# Patient Record
Sex: FEMALE | Race: ASIAN | NOT HISPANIC OR LATINO | Employment: FULL TIME
[De-identification: names, ages, dates, MRNs, and addresses within clinical notes are randomized per-mention and may not be internally consistent; named-entity substitution may affect disease eponyms.]

---

## 2017-02-08 ENCOUNTER — RECORDS - HEALTHEAST (OUTPATIENT)
Dept: ADMINISTRATIVE | Facility: OTHER | Age: 27
End: 2017-02-08

## 2017-02-14 ENCOUNTER — AMBULATORY - HEALTHEAST (OUTPATIENT)
Dept: LAB | Facility: HOSPITAL | Age: 27
End: 2017-02-14

## 2017-02-14 DIAGNOSIS — O99.810 ABNORMAL MATERNAL GLUCOSE TOLERANCE, ANTEPARTUM: ICD-10-CM

## 2017-04-30 ENCOUNTER — ANESTHESIA - HEALTHEAST (OUTPATIENT)
Dept: OBGYN | Facility: HOSPITAL | Age: 27
End: 2017-04-30

## 2017-05-01 ENCOUNTER — HOSPITAL ENCOUNTER (INPATIENT)
Dept: OBGYN | Facility: HOSPITAL | Age: 27
Discharge: HOME | End: 2017-05-04
Attending: OBSTETRICS & GYNECOLOGY | Admitting: OBSTETRICS & GYNECOLOGY
Payer: COMMERCIAL

## 2017-05-01 ENCOUNTER — SURGERY - HEALTHEAST (OUTPATIENT)
Dept: OBGYN | Facility: HOSPITAL | Age: 27
End: 2017-05-01

## 2017-05-01 DIAGNOSIS — Z98.891 S/P CESAREAN SECTION: ICD-10-CM

## 2017-05-01 LAB — SYPHILIS RPR SCREEN - HISTORICAL: NORMAL

## 2017-05-01 ASSESSMENT — MIFFLIN-ST. JEOR
SCORE: 1629.88
SCORE: 1629.88

## 2017-05-04 RX ORDER — FERROUS GLUCONATE 324(38)MG
324 TABLET ORAL
Refills: 0 | Status: SHIPPED | COMMUNITY
Start: 2017-05-04 | End: 2024-05-10

## 2017-05-04 RX ORDER — AMOXICILLIN 250 MG
1 CAPSULE ORAL DAILY
Refills: 0 | Status: SHIPPED | COMMUNITY
Start: 2017-05-04 | End: 2024-05-10

## 2017-10-15 ENCOUNTER — HEALTH MAINTENANCE LETTER (OUTPATIENT)
Age: 27
End: 2017-10-15

## 2021-05-11 ENCOUNTER — COMMUNICATION - HEALTHEAST (OUTPATIENT)
Dept: SCHEDULING | Facility: CLINIC | Age: 31
End: 2021-05-11

## 2021-05-27 ENCOUNTER — AMBULATORY - HEALTHEAST (OUTPATIENT)
Dept: NURSING | Facility: CLINIC | Age: 31
End: 2021-05-27

## 2021-06-15 ENCOUNTER — AMBULATORY - HEALTHEAST (OUTPATIENT)
Dept: NURSING | Facility: CLINIC | Age: 31
End: 2021-06-15

## 2021-07-15 VITALS
HEIGHT: 62 IN | HEIGHT: 62 IN | BODY MASS INDEX: 39.01 KG/M2 | WEIGHT: 212 LBS | WEIGHT: 212 LBS | BODY MASS INDEX: 39.01 KG/M2

## 2021-07-15 NOTE — OP NOTE
Section Operative Note    NAME:  Hailey Pompa     RECORD # [id number]     2017    DATE OF SERVICE: 2017     PREOPERATIVE DIAGNOSIS:   1) IUP at 39+1 weeks  2) Previous  section x 2    PROCEDURE: Low transverse  section, Lysis of Adhesions    SURGEON:  Sukhi Hines MD     ASSISTANT: ANGUS Soto    ANESTHESIA: Spinal    ESTIMATED BLOOD LOSS: 500cc    DRAINS: Thakkar catheter.    COMPLICATIONS: None    INDICATIONS:  Ms. Hailey Pompa is a 26 y.o. year old who presents for a repeat  section. SHe has had 2 cesareans in the past. Options discussed. Risks and benefits discussed.    FINDINGS:  Live male infant born with Apgars of 8 at one minute, 9 at 5 minutes,  Weight 7#15oz.  Normal tubes, ovaries, and pelvic organs. There were dense adhesions from the bladder to the anterior abdominal wall. The dome of the bladder was upto the lower uterine segment. Dense omental adhesions to the anterior abdominal wall as well.     PROCEDURE:  Patient was met preoperatively where we discussed the procedure and the risks associated with the procedure.  She understood these to include but not limited to injury to adjacent organs including bowel, bladder, ureter, infection and bleeding. Understanding these risks her consents were signed.      After informed consent was obtained, the patient was taken to the operating room where she was given spinal anesthesia.  She was placed in the left lateral tilt position and prepped and draped in usual sterile fashion.  A timeout was undertaken.      A Pfannenstiel skin incision was made with the scalpel and taken down through the subcutaneous tissue.  The rectus fascia was identified and scored in the midline.  The rectus fascia was then opened bilaterally.  The rectus fascia was taken down from the underlying muscle superiorly and inferiorly.  The peritoneum was identified and entered bluntly using my fingers.  At this point dense omental adhesions were  encountered. These were taken down with the cautery. The bladder was identified and dense adhesions noted coming upto the lower uterine segment. These were taken down with blunt and sharp dissection. Once there was enough room for delivery of the infant, the bladder blade inserted.    A low segment transverse uterine incision was made with a scalpel.  The uterine incision was opened bluntly with my fingers.  The infant was delivered from a vertex position.  The head was delivered with the assistance of a vacuum. One pull was undertaken and the head was delivered.  The remainder of the body was delivered without issues.  The mouth and nares were suctioned.  The cord was doubly clamped and cut (delayed cord clamping) and the infant was handed to the pediatric team with the findings as noted above.  The placenta was then removed with gentle traction.  The gutters were irrigated. Hemostasis was achieved.  The uterine incision was closed using 0 Vicryl in a running locked fashion.  A second imbricating layer was placed using 0 Monocryl.  Hemostasis was achieved.      The peritoneum was closed using 3-0 Vicryl. The rectus muscles were hemostatic.  The rectus fascia was closed using 0 vicryl, starting at the lateral edges meeting in the midline.  Skin margins were re-approximated using the Insorb staplers.      Sponge and needle counts were reported to me as correct X 2.  Mother and infant are stable.      Sukhi Hines MD

## 2021-07-15 NOTE — ANESTHESIA CARE TRANSFER NOTE
Last vitals:   Vitals:    05/01/17 0900   BP: 114/75   Pulse: 72   Resp: 16   Temp: 37.4  C (99.3  F)   SpO2: 96%     Patient's level of consciousness is awake  Spontaneous respirations: yes  Maintains airway independently: yes  Dentition unchanged: yes  Oropharynx: oropharynx clear of all foreign objects    QCDR Measures:  ASA# 20 - Surgical Safety Checklist: ASA20A - Safety Checks Done  PQRS# 430 - Adult PONV Prevention: 4558F - Pt received => 2 anti-emetic agents (different classes) preop & intraop  ASA# 8 - Peds PONV Prevention: NA - Not pediatric patient, not GA or 2 or more risk factors NOT present  PQRS# 424 - Sagrario-op Temp Management: 4559F - At least one body temp DOCUMENTED => 35.5C or 95.9F within required timeframe  PQRS# 426 - PACU Transfer Protocol: - Transfer of care checklist used  ASA# 14 - Acute Post-op Pain: ASA14B - Patient did NOT experience pain >= 7 out of 10    I completed my SBAR handoff to the receiving nurse per policy and procedure.

## 2021-07-15 NOTE — DISCHARGE SUMMARY
HOSPITAL DISCHARGE SUMMARY - C SECTION     NAME: Hailey Pompa   : 1990    MRN: [id number]    PCP: No Primary Care Provider    ADMISSION DATE:  2017  DELIVERY DATE:   2017   GESTATIONAL AGE:  39w1d     DISCHARGE DATE:  2017    REASON FOR ADMISSION: IUP 39.1 weeks, Previous  x 2    DIAGNOSIS:    1.  Birth secondary to elective repeat  2. Apgars of 8    at 1 minute, 9   at 5 minutes  3. Weight (oz):  126.98  oz.  4. Blood loss anemia    CONDITIONS COMPLICATION ANTEPARTUM/POSTPARTUM:  Refer to prenatal   Antepartum: Previous  x 2   Intrapartum: None  Postpartum: Anemia - due to surgical blood loss    PROCEDURES:  Low transverse     SIGNIFICANT DIAGNOSTIC PROCEDURES:   None    CONSULTS:   None    HISTORY OF PRESENT ILLNESS AND HOSPITAL COURSE: Hailey Pompa  is a 26 y.o.,  female who underwent repeat  section. Postoperative course was remarkable for blood loss anemia.  Patient is asymptomatic and vital signs are stable. She denies chest pain, shortness of breath or dizziness. Patient denies any new abdominal pain.  On the day of discharge patient was tolerating diet, pain was controlled with oral medications, she was voiding and passing gas.    EXAM:  Incision - slightly ecchymotic but intact. Abdomen is soft and relatively nontender. LEs - show 1+ edema, L slightly more than R. Neg Homans.     LABS:  Lab Results   Component Value Date    HGB 7.1 (L) 2017       Pre-operative hgb : 11+      PENDING LABS:  None     DISPOSITION:  Home    DISCHARGE CONDITION: Good/Stable    DISCHARGE MEDICATIONS:      Medication List      START taking these medications          ferrous gluconate 324 MG tablet   Commonly known as:  FERGON   Take 1 tablet (324 mg total) by mouth daily with breakfast.       ibuprofen 600 MG tablet   Commonly known as:  ADVIL,MOTRIN   Take 1 tablet (600 mg total) by mouth every 6 (six) hours for 10 days.       oxyCODONE-acetaminophen  5-325 mg per tablet   Commonly known as:  PERCOCET   Take 1-2 tablets by mouth every 4 (four) hours as needed.       senna-docusate 8.6-50 mg tablet   Commonly known as:  PERICOLACE   Take 1 tablet by mouth daily.            Where to Get Your Medications      You can get these medications from any pharmacy     Bring a paper prescription for each of these medications      ibuprofen 600 MG tablet     oxyCODONE-acetaminophen 5-325 mg per tablet       You don't need a prescription for these medications      ferrous gluconate 324 MG tablet     senna-docusate 8.6-50 mg tablet               DISCHARGE PLAN:   - Follow up with  Dr. Hines, in 1-2 weeks  - Take medication as prescribed  - Physical activity: As tolerated, no heavy lifting. Pelvic rest.  - Diet:  Regular  - Medication:  Please see MAR - iron added  - Warning signs discussed with patient about when to call the clinic/hospital  - All questions and concerns were answered for the patient prior to discharge.         Yocasta Weston MD     I saw the patient on the date of discharge  Total time spent for discharge on date of discharge: 20 minutes    Physician(s) in addition to primary physician who should receive a copy:  CC1: DICK Hines MD

## 2021-07-15 NOTE — ANESTHESIA PROCEDURE NOTES
Spinal Block    Patient location during procedure: OB  Start time: 5/1/2017 8:45 AM  End time: 5/1/2017 8:48 AM  Reason for block: primary anesthetic    Staffing:  Performing  Anesthesiologist: SALLIE OSORIO    Preanesthetic Checklist  Completed: patient identified, risks, benefits, and alternatives discussed, timeout performed, consent obtained, airway assessed, oxygen available, suction available, emergency drugs available and hand hygiene performed  Spinal Block  Patient position: sitting  Prep: ChloraPrep  Patient monitoring: heart rate, cardiac monitor, continuous pulse ox and blood pressure  Approach: midline  Location: L3-4  Injection technique: single-shot  Needle type: pencil-tip   Needle gauge: 24 G    Assessment  Sensory level: T6

## 2021-07-15 NOTE — H&P
"OB HISTORY AND PHYSICAL UPDATE ADMISSION EXAM    Hailey Pompa  1990  [id number]    HPI: 27 yo  at 39+1 weeks admitted for a repeat  section.    Estimated Date of Delivery: 17                       EGA 39w1d    OB History    Para Term  AB Living   3 3 3 0 0 3   SAB TAB Ectopic Multiple Live Births   0 0 0 0       # Outcome Date GA Lbr Derrek/2nd Weight Sex Delivery Anes PTL Lv   3 Term 17 39w1d  7 lb 15 oz (3.6 kg) M C-Sxn Vac Spinal N CAMILLE   2 Term      CS-LTranv   CAMILLE   1 Term      CS-LTranv   CAMILLE          Prenatal Complications None    Exam:    /74 (Patient Position: Sitting)  Pulse 96  Temp 98.1  F (36.7  C) (Oral)   Resp 16  Ht 5' 2\" (1.575 m)  Wt 212 lb (96.2 kg)  SpO2 98%  Breastfeeding? Unknown  BMI 38.78 kg/m2        HEENT          WNL              Heart              WNL               Lungs             WNL                      Abdomen        WNL                       Extremities     WNL                     Fetal Status    Category I    Assessment: For  section    Plan: Planned  section    Sukhi Hines MD  "

## 2021-07-15 NOTE — ANESTHESIA PREPROCEDURE EVALUATION
Anesthesia Evaluation      Patient summary reviewed     Airway   Mallampati: II  Neck ROM: full   Pulmonary - normal exam   (+) a smoker    ROS comment: Recent acute URI                         Cardiovascular - negative ROS and normal exam   Neuro/Psych      Endo/Other    (+) obesity, pregnant     GI/Hepatic/Renal - negative ROS      Other findings: Repeat Csection      Dental                         Anesthesia Plan  Planned anesthetic: spinal    ASA 2   Induction: intravenous   Anesthetic plan and risks discussed with: patient  Anesthesia plan special considerations: antiemetics,   Post-op plan: routine recovery

## 2021-07-15 NOTE — TELEPHONE ENCOUNTER
Needs to establish primary care - has been having lower R abdominal/flank pain. Radiates from back to front. On/off last 3 months. History of familial issues with kidney. Rates pain 7-8/10. No relief from tylenol or hot packs.     Per protocol advised ED evaluation - patient wants to be seen in clinic. Reviewed protocol recommendations - patient wanting clinic appointment. Warm transferred to scheduling.     Aylin Sheppard RN, Triage Nurse Advisor    Reason for Disposition    [1] SEVERE pain (e.g., excruciating, scale 8-10) AND [2] present > 1 hour    Additional Information    Negative: Passed out (i.e., lost consciousness, collapsed and was not responding)    Negative: Shock suspected (e.g., cold/pale/clammy skin, too weak to stand, low BP, rapid pulse)    Negative: Difficult to awaken or acting confused (e.g., disoriented, slurred speech)    Negative: Sounds like a life-threatening emergency to the triager    Negative: Followed a major injury to the back (e.g., MVA, fall > 10 feet or 3 meters, penetrating injury, etc.)    Negative: Back pain or flank pain during pregnancy    Negative: Upper, mid or lower back pain that occurs mainly in the midline    Protocols used: FLANK PAIN-A-AH

## 2021-07-15 NOTE — PROGRESS NOTES
Postpartum Day 2    Patient Name:  Hailey Pompa  :  1990  MRN:  [id number]      Assessment:  Blood loss anemia, vital stable, declines transfusion at this time.    Plan:  Continue current care.  Repeat Hb later today. Discharge home tomorrow.      Subjective:  The patient feels tired but voiding without difficulty, lochia normal, tolerating normal diet, and passing flatus.  Pain is well controlled with current medications.    Objective:  Vitals:    17 0700   BP: 92/57   Pulse: 78   Resp: 16   Temp: 98.2  F (36.8  C)   SpO2: 98%     Patient Vitals for the past 24 hrs:   BP Temp Temp src Pulse Resp SpO2   17 0700 92/57 98.2  F (36.8  C) Oral 78 16 98 %   17 2340 118/66 97.6  F (36.4  C) Oral (!) 104 18 97 %       The amount and color of the lochia is appropriate for the duration of recovery.  The uterine fundus is firm.  Urinary output is adequate.  The incision is slight bruising, inzorb The patient is ambulating well.  The patient is tolerating a regular diet.  Hb 11-7.6-7.4-6.8    Prenatal Labs  Result Component Result Previous Result   ABO/Rh External Result O Positive (10/13/2016) No Results   ABORh O POS (2017) O POS (2012)   Hemoglobin External Result 12.3 (10/13/2016) No Results   Rubella External Result Immune (10/13/2016) No Results       Immunization History   Administered Date(s) Administered     Tdap 2008         Provider:  Carmen Boyer MD FACOG  [facility]  [phone]      Date:  5/3/2017  Time:  11:58 AM

## 2021-07-15 NOTE — PROGRESS NOTES
"Outreach Note for [facility]    Hailey Pompa  [id number]  1990    Discharge follow-up plan discussed with patient, Hailey Smith, needs assessed. Hailey Smith requests all follow-up through clinic/physician; declines home care visit, unless medically indicated, and declines follow-up phone call.   Hailey Smith reports she is enrolled in [facility], has good support at home, and feels ready to discharge today. Handout provided with information and contact phone numbers to assist with adding , \"Ihsan Triplettjaziel Santos\", to MA plan. No further needs identified at this time.    Completed by: Vee Yanez RN      "

## 2021-07-15 NOTE — ANESTHESIA POSTPROCEDURE EVALUATION
Patient: Hailey Pompa  REPEAT  SECTION X 3  Anesthesia type: regional    Patient location: PACU  Last vitals:   Vitals:    17 0229   BP: 103/70   Pulse: 80   Resp: 24   Temp: 36.8  C (98.2  F)   SpO2:      Post vital signs: stable  Level of consciousness: awake and responds to simple questions  Post-anesthesia pain: pain controlled  Post-anesthesia nausea and vomiting: no  Pulmonary: unassisted, return to baseline  Cardiovascular: stable and blood pressure at baseline  Hydration: adequate  Anesthetic events: no    QCDR Measures:  ASA# 11 - Sagrario-op Cardiac Arrest: ASA11B - Patient did NOT experience unanticipated cardiac arrest  ASA# 12 - Sagrario-op Mortality Rate: ASA12B - Patient did NOT die  ASA# 13 - PACU Re-Intubation Rate: NA - No ETT / LMA used for case  ASA# 10 - Composite Anes Safety: ASA10A - No serious adverse event  ASA# 38 - New Corneal Injury: ASA38A - No new exposure keratitis or corneal abrasion in PACU    Additional Notes:

## 2021-07-15 NOTE — PROGRESS NOTES
"POSTPARTUM DAY #1 -      Subjective:  The patient has pain radiating to L shoulder which is 'the worst pain ever'. She is sitting up in bed with leg dangling. Denies lightheadedness or dizziness.  She just took percocet at 7 am. The patient has been up since her c section. She has not had the catheter removed. She is passing some gas.The amount and color of the lochia is appropriate for the duration of recovery, patient denies passing clots. The baby is stable and doing well.    Objective   The patient has a blood pressure which is within the normal range. Urinary output is adequate.     Exam:   /70 (Patient Position: Sitting)  Pulse 80  Temp 98.2  F (36.8  C) (Oral)   Resp 24  Ht 5' 2\" (1.575 m)  Wt 212 lb (96.2 kg)  SpO2 96%  Breastfeeding? Unknown  BMI 38.78 kg/m2  General: NAD, alert and orientated   Abdomen: soft - bandage with dried blood R lower side.   Ext: no pain, edema of hands and feet - mild    LAB:  Lab Results   Component Value Date    HGB 7.6 (L) 2017         I/Os    Intake/Output Summary (Last 24 hours) at 17 0728  Last data filed at 17 0500   Gross per 24 hour   Intake             2200 ml   Output             2800 ml   Net             -600 ml         Impression:   POD#1 LTCS - Repeat  Anemia - post op    Plan:   Repeat Hgb at 12 Noon    POSTOPERATIVE   - DC catheter when pt able to ambulate independently  - Oral pain meds - consider hydroxyzine with the percocet/ibuprofen for better pain control  - Ambulation as tolerated    Yocasta Weston M.D.  [phone]      "

## 2021-07-16 ENCOUNTER — RECORDS - HEALTHEAST (OUTPATIENT)
Dept: ADMINISTRATIVE | Facility: CLINIC | Age: 31
End: 2021-07-16

## 2021-08-01 ENCOUNTER — HEALTH MAINTENANCE LETTER (OUTPATIENT)
Age: 31
End: 2021-08-01

## 2021-09-26 ENCOUNTER — HEALTH MAINTENANCE LETTER (OUTPATIENT)
Age: 31
End: 2021-09-26

## 2021-10-19 ENCOUNTER — LAB REQUISITION (OUTPATIENT)
Dept: LAB | Facility: CLINIC | Age: 31
End: 2021-10-19

## 2021-10-19 LAB — HBV SURFACE AG SERPL QL IA: NONREACTIVE

## 2021-10-19 PROCEDURE — 86787 VARICELLA-ZOSTER ANTIBODY: CPT | Performed by: INTERNAL MEDICINE

## 2021-10-19 PROCEDURE — 86481 TB AG RESPONSE T-CELL SUSP: CPT | Performed by: INTERNAL MEDICINE

## 2021-10-19 PROCEDURE — 86735 MUMPS ANTIBODY: CPT | Performed by: INTERNAL MEDICINE

## 2021-10-19 PROCEDURE — 86762 RUBELLA ANTIBODY: CPT | Performed by: INTERNAL MEDICINE

## 2021-10-19 PROCEDURE — 86706 HEP B SURFACE ANTIBODY: CPT | Performed by: INTERNAL MEDICINE

## 2021-10-19 PROCEDURE — 86765 RUBEOLA ANTIBODY: CPT | Performed by: INTERNAL MEDICINE

## 2021-10-19 PROCEDURE — 87340 HEPATITIS B SURFACE AG IA: CPT | Performed by: INTERNAL MEDICINE

## 2021-10-20 LAB
GAMMA INTERFERON BACKGROUND BLD IA-ACNC: 0.37 IU/ML
HBV SURFACE AB SERPL IA-ACNC: 11.12 M[IU]/ML
M TB IFN-G BLD-IMP: NEGATIVE
M TB IFN-G CD4+ BCKGRND COR BLD-ACNC: 9.63 IU/ML
MEV IGG SER IA-ACNC: <5 AU/ML
MEV IGG SER IA-ACNC: NORMAL
MITOGEN IGNF BCKGRD COR BLD-ACNC: -0.03 IU/ML
MITOGEN IGNF BCKGRD COR BLD-ACNC: -0.07 IU/ML
MUMPS ANTIBODY IGG INSTRUMENT VALUE: 7 AU/ML
MUV IGG SER QL IA: NORMAL
QUANTIFERON MITOGEN: 10 IU/ML
QUANTIFERON NIL TUBE: 0.37 IU/ML
QUANTIFERON TB1 TUBE: 0.3 IU/ML
QUANTIFERON TB2 TUBE: 0.34
RUBV IGG SERPL QL IA: 1.49 INDEX
RUBV IGG SERPL QL IA: POSITIVE
VZV IGG SER QL IA: 173.1 INDEX
VZV IGG SER QL IA: POSITIVE

## 2021-11-11 ENCOUNTER — TELEPHONE (OUTPATIENT)
Dept: FAMILY MEDICINE | Facility: CLINIC | Age: 31
End: 2021-11-11
Payer: COMMERCIAL

## 2021-11-11 NOTE — TELEPHONE ENCOUNTER
Called pt she is incorrectly scheduled with Dr. Mckeon.  Informed pt she will need to schedule with another provider.  Pt questioned if she could be seen today or tomorrow, informed pt [facility] has no openings.  Pt states she was not immune to chicken pox and will need an varicella immunization.  Informed pt Occup[facility] should be able to help her with this.  She states they told her she may need to see a provider.  Pt states she may just go to [facility] to be seen, informed pt [facility] does not see pts for immunizations.  Pt questioning where she can get a VZV immunization before she starts to work for [employer].  Informed pt she could try a pharmacy or [facility].  Offered to schedule appointment with another provider, she declined.   Informed pt appointment will be cancelled.

## 2021-11-11 NOTE — TELEPHONE ENCOUNTER
Left message for pt to call back.  Pt scheduled with Dr. Mckeon on 11/12/21 pt will need to reschedule with a provider taking pts into their practice.   It pt calls back please assist with rescheduling pt.

## 2022-08-28 ENCOUNTER — HEALTH MAINTENANCE LETTER (OUTPATIENT)
Age: 32
End: 2022-08-28

## 2022-09-27 ENCOUNTER — HOSPITAL ENCOUNTER (EMERGENCY)
Facility: HOSPITAL | Age: 32
Discharge: HOME | End: 2022-09-27
Attending: EMERGENCY MEDICINE | Admitting: EMERGENCY MEDICINE
Payer: COMMERCIAL

## 2022-09-27 VITALS
BODY MASS INDEX: 40.79 KG/M2 | HEART RATE: 80 BPM | DIASTOLIC BLOOD PRESSURE: 97 MMHG | SYSTOLIC BLOOD PRESSURE: 160 MMHG | WEIGHT: 223 LBS | OXYGEN SATURATION: 97 % | RESPIRATION RATE: 17 BRPM | TEMPERATURE: 99.3 F

## 2022-09-27 DIAGNOSIS — R51.9 NONINTRACTABLE HEADACHE, UNSPECIFIED CHRONICITY PATTERN, UNSPECIFIED HEADACHE TYPE: ICD-10-CM

## 2022-09-27 LAB
ALBUMIN SERPL BCG-MCNC: 4.6 G/DL (ref 3.5–5.2)
ALP SERPL-CCNC: 84 U/L (ref 35–104)
ALT SERPL W P-5'-P-CCNC: 82 U/L (ref 10–35)
ANION GAP SERPL CALCULATED.3IONS-SCNC: 13 MMOL/L (ref 7–15)
AST SERPL W P-5'-P-CCNC: 98 U/L (ref 10–35)
BASOPHILS # BLD AUTO: 0 10E3/UL (ref 0–0.2)
BASOPHILS NFR BLD AUTO: 0 %
BILIRUB DIRECT SERPL-MCNC: 0.31 MG/DL (ref 0–0.3)
BILIRUB SERPL-MCNC: 1.4 MG/DL
BUN SERPL-MCNC: 8.9 MG/DL (ref 6–20)
CALCIUM SERPL-MCNC: 9.3 MG/DL (ref 8.6–10)
CHLORIDE SERPL-SCNC: 103 MMOL/L (ref 98–107)
CREAT SERPL-MCNC: 0.75 MG/DL (ref 0.51–0.95)
DEPRECATED HCO3 PLAS-SCNC: 23 MMOL/L (ref 22–29)
EOSINOPHIL # BLD AUTO: 0.1 10E3/UL (ref 0–0.7)
EOSINOPHIL NFR BLD AUTO: 1 %
ERYTHROCYTE [DISTWIDTH] IN BLOOD BY AUTOMATED COUNT: 11.9 % (ref 10–15)
FLUAV RNA SPEC QL NAA+PROBE: NEGATIVE
FLUBV RNA RESP QL NAA+PROBE: NEGATIVE
GFR SERPL CREATININE-BSD FRML MDRD: >90 ML/MIN/1.73M2
GLUCOSE SERPL-MCNC: 105 MG/DL (ref 70–99)
HCG SERPL QL: NEGATIVE
HCT VFR BLD AUTO: 46.6 % (ref 35–47)
HGB BLD-MCNC: 15.5 G/DL (ref 11.7–15.7)
IMM GRANULOCYTES # BLD: 0 10E3/UL
IMM GRANULOCYTES NFR BLD: 0 %
LYMPHOCYTES # BLD AUTO: 3.1 10E3/UL (ref 0.8–5.3)
LYMPHOCYTES NFR BLD AUTO: 29 %
MCH RBC QN AUTO: 31.8 PG (ref 26.5–33)
MCHC RBC AUTO-ENTMCNC: 33.3 G/DL (ref 31.5–36.5)
MCV RBC AUTO: 96 FL (ref 78–100)
MONOCYTES # BLD AUTO: 0.6 10E3/UL (ref 0–1.3)
MONOCYTES NFR BLD AUTO: 6 %
NEUTROPHILS # BLD AUTO: 6.7 10E3/UL (ref 1.6–8.3)
NEUTROPHILS NFR BLD AUTO: 64 %
NRBC # BLD AUTO: 0 10E3/UL
NRBC BLD AUTO-RTO: 0 /100
PLATELET # BLD AUTO: 314 10E3/UL (ref 150–450)
POTASSIUM SERPL-SCNC: 3.6 MMOL/L (ref 3.4–5.3)
PROT SERPL-MCNC: 8.1 G/DL (ref 6.4–8.3)
RBC # BLD AUTO: 4.88 10E6/UL (ref 3.8–5.2)
RSV RNA SPEC NAA+PROBE: NEGATIVE
SARS-COV-2 RNA RESP QL NAA+PROBE: NEGATIVE
SODIUM SERPL-SCNC: 139 MMOL/L (ref 136–145)
WBC # BLD AUTO: 10.5 10E3/UL (ref 4–11)

## 2022-09-27 PROCEDURE — 82248 BILIRUBIN DIRECT: CPT | Performed by: EMERGENCY MEDICINE

## 2022-09-27 PROCEDURE — 258N000003 HC RX IP 258 OP 636: Performed by: EMERGENCY MEDICINE

## 2022-09-27 PROCEDURE — 96374 THER/PROPH/DIAG INJ IV PUSH: CPT

## 2022-09-27 PROCEDURE — 84703 CHORIONIC GONADOTROPIN ASSAY: CPT | Performed by: EMERGENCY MEDICINE

## 2022-09-27 PROCEDURE — C9803 HOPD COVID-19 SPEC COLLECT: HCPCS

## 2022-09-27 PROCEDURE — 87637 SARSCOV2&INF A&B&RSV AMP PRB: CPT | Performed by: EMERGENCY MEDICINE

## 2022-09-27 PROCEDURE — 85025 COMPLETE CBC W/AUTO DIFF WBC: CPT | Performed by: EMERGENCY MEDICINE

## 2022-09-27 PROCEDURE — 82374 ASSAY BLOOD CARBON DIOXIDE: CPT | Performed by: EMERGENCY MEDICINE

## 2022-09-27 PROCEDURE — 36415 COLL VENOUS BLD VENIPUNCTURE: CPT | Performed by: EMERGENCY MEDICINE

## 2022-09-27 PROCEDURE — 99284 EMERGENCY DEPT VISIT MOD MDM: CPT | Mod: 25

## 2022-09-27 PROCEDURE — 250N000011 HC RX IP 250 OP 636: Performed by: EMERGENCY MEDICINE

## 2022-09-27 PROCEDURE — 96375 TX/PRO/DX INJ NEW DRUG ADDON: CPT

## 2022-09-27 RX ORDER — KETOROLAC TROMETHAMINE 15 MG/ML
15 INJECTION, SOLUTION INTRAMUSCULAR; INTRAVENOUS ONCE
Status: COMPLETED | OUTPATIENT
Start: 2022-09-27 | End: 2022-09-27

## 2022-09-27 RX ORDER — DIPHENHYDRAMINE HYDROCHLORIDE 50 MG/ML
25 INJECTION INTRAMUSCULAR; INTRAVENOUS ONCE
Status: COMPLETED | OUTPATIENT
Start: 2022-09-27 | End: 2022-09-27

## 2022-09-27 RX ADMIN — DIPHENHYDRAMINE HYDROCHLORIDE 25 MG: 50 INJECTION, SOLUTION INTRAMUSCULAR; INTRAVENOUS at 16:13

## 2022-09-27 RX ADMIN — KETOROLAC TROMETHAMINE 15 MG: 15 INJECTION, SOLUTION INTRAMUSCULAR; INTRAVENOUS at 16:11

## 2022-09-27 RX ADMIN — PROCHLORPERAZINE EDISYLATE 10 MG: 5 INJECTION INTRAMUSCULAR; INTRAVENOUS at 16:18

## 2022-09-27 RX ADMIN — SODIUM CHLORIDE 1000 ML: 9 INJECTION, SOLUTION INTRAVENOUS at 16:11

## 2022-09-27 ASSESSMENT — ACTIVITIES OF DAILY LIVING (ADL)
ADLS_ACUITY_SCORE: 33
ADLS_ACUITY_SCORE: 33

## 2022-09-27 NOTE — ED PROVIDER NOTES
5:55 PM.  Called the patient twice in the lobby with no answer.  Laboratory work done early unremarkable other than mild elevations of AST and ALT.  CBC unremarkable.  Chemistries otherwise unremarkable.  Pregnancy, flu and COVID testing negative.  Patient received a liter of saline, Benadryl, Toradol, Compazine.  We suspect that her headache was feeling better and that the patient left without being seen after triage and after labs and medications.  I did not see the patient.     Robe Jones MD  09/27/22 4810

## 2022-09-27 NOTE — ED NOTES
ED Provider In Triage Note  [facility]  Encounter Date: Sep 27, 2022    Chief Complaint   Patient presents with     Headache       Brief HPI:   Samantha Pompa is a 32 year old female presenting to the Emergency Department with a chief complaint of diffuse frontal HA and facial pain for past couple days.  Pt at work and they checked BP, it was elevated, so she came into ED.  Pt denies fevers.      Brief Physical Exam:  BP (!) 188/117   Pulse 92   Temp 99.3  F (37.4  C)   Resp 16   Wt 101.2 kg (223 lb)   SpO2 97%   BMI 40.79 kg/m    General: Non-toxic appearing  HEENT: Atraumatic  Resp: No respiratory distress  Abdomen: Non-peritoneal  Neuro: Alert, oriented, answers questions appropriately  Psych: Behavior appropriate      Plan Initiated in Triage:  Orders Placed This Encounter     Basic metabolic panel     HCG QUALitative pregnancy (blood)     Hepatic function panel     Symptomatic; Unknown Influenza A/B & SARS-CoV2 (COVID-19) Virus PCR Multiplex     0.9% sodium chloride BOLUS     ketorolac (TORADOL) injection 15 mg     prochlorperazine (COMPAZINE) injection 10 mg     diphenhydrAMINE (BENADRYL) injection 25 mg       PIT Dispo:   Return to lobby while awaiting workup and ED bed availability    Robe Zhao MD on 9/27/2022 at 3:48 PM    Patient was evaluated by the Physician in Triage due to a limitation of available rooms in the Emergency Department. A plan of care was discussed based on the information obtained on the initial evaluation and patient was consuled to return back to the Emergency Department lobby after this initial evalutaiton until results were obtained or a room became available in the Emergency Department. Patient was counseled not to leave prior to receiving the results of their workup.     Robe Zhao MD  [facility] EMERGENCY DEPARTMENT  [address] [place] [id number]  [phone]     Robe Zhao MD  09/27/22  [id number]

## 2022-09-27 NOTE — ED TRIAGE NOTES
"Pt reports HA \"all over\" along with neck pain x 2 days. She checked her BP at work and is concerned that it is high.      "

## 2022-09-28 NOTE — ED NOTES
Patient was called again in waiting room with no answer. Writer attempted to reach patient by phone without answer. Message was left on voice mail to return call to emergency department as we need to ensure IV is safely removed. Charge RN, Triage RN, and RN remaining in WR notified.

## 2022-10-24 ENCOUNTER — LAB REQUISITION (OUTPATIENT)
Dept: LAB | Facility: CLINIC | Age: 32
End: 2022-10-24

## 2022-10-24 DIAGNOSIS — Z12.4 ENCOUNTER FOR SCREENING FOR MALIGNANT NEOPLASM OF CERVIX: ICD-10-CM

## 2022-10-24 PROCEDURE — 87624 HPV HI-RISK TYP POOLED RSLT: CPT | Performed by: OBSTETRICS & GYNECOLOGY

## 2022-10-24 PROCEDURE — G0124 SCREEN C/V THIN LAYER BY MD: HCPCS | Performed by: PATHOLOGY

## 2022-10-24 PROCEDURE — G0145 SCR C/V CYTO,THINLAYER,RESCR: HCPCS | Performed by: OBSTETRICS & GYNECOLOGY

## 2022-10-26 LAB
BKR LAB AP GYN ADEQUACY: ABNORMAL
BKR LAB AP GYN INTERPRETATION: ABNORMAL
BKR LAB AP HPV REFLEX: ABNORMAL
BKR LAB AP LMP: ABNORMAL
BKR LAB AP PREVIOUS ABNL DX: ABNORMAL
BKR LAB AP PREVIOUS ABNORMAL: ABNORMAL
PATH REPORT.COMMENTS IMP SPEC: ABNORMAL
PATH REPORT.COMMENTS IMP SPEC: ABNORMAL
PATH REPORT.RELEVANT HX SPEC: ABNORMAL

## 2022-10-28 LAB
HUMAN PAPILLOMA VIRUS 16 DNA: NEGATIVE
HUMAN PAPILLOMA VIRUS 18 DNA: NEGATIVE
HUMAN PAPILLOMA VIRUS FINAL DIAGNOSIS: NORMAL
HUMAN PAPILLOMA VIRUS OTHER HR: NEGATIVE

## 2023-04-23 ENCOUNTER — HEALTH MAINTENANCE LETTER (OUTPATIENT)
Age: 33
End: 2023-04-23

## 2023-09-24 ENCOUNTER — HEALTH MAINTENANCE LETTER (OUTPATIENT)
Age: 33
End: 2023-09-24

## 2024-05-07 ENCOUNTER — OFFICE VISIT (OUTPATIENT)
Dept: FAMILY MEDICINE | Facility: CLINIC | Age: 34
End: 2024-05-07
Payer: COMMERCIAL

## 2024-05-07 VITALS
DIASTOLIC BLOOD PRESSURE: 90 MMHG | HEART RATE: 84 BPM | SYSTOLIC BLOOD PRESSURE: 142 MMHG | TEMPERATURE: 98.1 F | OXYGEN SATURATION: 99 % | RESPIRATION RATE: 16 BRPM

## 2024-05-07 DIAGNOSIS — Z79.899 MEDICATION MANAGEMENT: ICD-10-CM

## 2024-05-07 DIAGNOSIS — I10 BENIGN ESSENTIAL HYPERTENSION: Primary | ICD-10-CM

## 2024-05-07 PROCEDURE — 99204 OFFICE O/P NEW MOD 45 MIN: CPT | Performed by: PHYSICIAN ASSISTANT

## 2024-05-07 RX ORDER — HYDROCHLOROTHIAZIDE 25 MG/1
25 TABLET ORAL DAILY
Qty: 30 TABLET | Refills: 0 | Status: SHIPPED | OUTPATIENT
Start: 2024-05-07 | End: 2024-05-07

## 2024-05-07 SDOH — HEALTH STABILITY: PHYSICAL HEALTH: ON AVERAGE, HOW MANY MINUTES DO YOU ENGAGE IN EXERCISE AT THIS LEVEL?: 30 MIN

## 2024-05-07 SDOH — HEALTH STABILITY: PHYSICAL HEALTH: ON AVERAGE, HOW MANY DAYS PER WEEK DO YOU ENGAGE IN MODERATE TO STRENUOUS EXERCISE (LIKE A BRISK WALK)?: 3 DAYS

## 2024-05-07 ASSESSMENT — ANXIETY QUESTIONNAIRES
7. FEELING AFRAID AS IF SOMETHING AWFUL MIGHT HAPPEN: NOT AT ALL
GAD7 TOTAL SCORE: 0
1. FEELING NERVOUS, ANXIOUS, OR ON EDGE: NOT AT ALL
6. BECOMING EASILY ANNOYED OR IRRITABLE: NOT AT ALL
2. NOT BEING ABLE TO STOP OR CONTROL WORRYING: NOT AT ALL
5. BEING SO RESTLESS THAT IT IS HARD TO SIT STILL: NOT AT ALL
3. WORRYING TOO MUCH ABOUT DIFFERENT THINGS: NOT AT ALL
4. TROUBLE RELAXING: NOT AT ALL
IF YOU CHECKED OFF ANY PROBLEMS ON THIS QUESTIONNAIRE, HOW DIFFICULT HAVE THESE PROBLEMS MADE IT FOR YOU TO DO YOUR WORK, TAKE CARE OF THINGS AT HOME, OR GET ALONG WITH OTHER PEOPLE: NOT DIFFICULT AT ALL
8. IF YOU CHECKED OFF ANY PROBLEMS, HOW DIFFICULT HAVE THESE MADE IT FOR YOU TO DO YOUR WORK, TAKE CARE OF THINGS AT HOME, OR GET ALONG WITH OTHER PEOPLE?: NOT DIFFICULT AT ALL
GAD7 TOTAL SCORE: 0
GAD7 TOTAL SCORE: 0
7. FEELING AFRAID AS IF SOMETHING AWFUL MIGHT HAPPEN: NOT AT ALL

## 2024-05-07 ASSESSMENT — PATIENT HEALTH QUESTIONNAIRE - PHQ9
SUM OF ALL RESPONSES TO PHQ QUESTIONS 1-9: 2
SUM OF ALL RESPONSES TO PHQ QUESTIONS 1-9: 2
10. IF YOU CHECKED OFF ANY PROBLEMS, HOW DIFFICULT HAVE THESE PROBLEMS MADE IT FOR YOU TO DO YOUR WORK, TAKE CARE OF THINGS AT HOME, OR GET ALONG WITH OTHER PEOPLE: NOT DIFFICULT AT ALL

## 2024-05-07 ASSESSMENT — SOCIAL DETERMINANTS OF HEALTH (SDOH): HOW OFTEN DO YOU GET TOGETHER WITH FRIENDS OR RELATIVES?: ONCE A WEEK

## 2024-05-07 NOTE — LETTER
May 7, 2024      Samantha Pompa  [address] [place] [address] [place] [id number]        To Whom It May Concern:    Samantha Pompa was seen in our clinic. She may return to work without restrictions 5/8/24.      Sincerely,        Robin Nguyen PA-C           Unknown

## 2024-05-07 NOTE — PATIENT INSTRUCTIONS
Stop smoking if possible  Stop drinking alcohol if possible  Decrease sodium in your diet and try to lose weight  Increase exercise to 60 minutes/week or 20 minutes 3 times per week    Follow-up with your primary care provider later this week on Friday the 10th to recheck blood pressure and treatment of the high blood pressure.

## 2024-05-07 NOTE — PROGRESS NOTES
Patient presents with:  Headache: Has headachee on and off 1 week BP elevated today  and has stiff neck      Clinical Decision Making:  Patient had requested treatment of elevated blood pressure in the setting of her intermittent tension headache over the last week.  She did have an elevated blood pressure of 147/108 at her at home reading.  In the office it was read as 142/90.  Patient was offered to have a basic metabolic panel CBC and EKG in order to start hypertension medication in the office today.  She declined that.  Likewise I was going to send in hydrochlorothiazide 25 mg once daily after the laboratory values and EKG were performed that she would be safely started on a diuretic.  Again patient declined the testing so the medication was not written.  We did discuss the etiology and treatment of hypertension.  Did also recommend that she follow-up in the [facility] system for establishing care with her primary care provider.  She did not want to have a referral to establish care in the [facility] system at this time.  Questions were answered to patient's satisfaction before discharge.          ICD-10-CM    1. Benign essential hypertension  I10 PRIMARY CARE FOLLOW-UP SCHEDULING     DISCONTINUED: hydrochlorothiazide (HYDRODIURIL) 25 MG tablet     CANCELED: CBC with platelets and differential     CANCELED: Basic metabolic panel     CANCELED: EKG 12-lead, tracing only      2. Medication management  Z79.899           Patient Instructions   Stop smoking if possible  Stop drinking alcohol if possible  Decrease sodium in your diet and try to lose weight  Increase exercise to 60 minutes/week or 20 minutes 3 times per week    Follow-up with your primary care provider later this week on Friday the 10th to recheck blood pressure and treatment of the high blood pressure.          HPI:  Samantha Pompa is a 33 year old female who has a past medical history of hypertension was concerned that the patient has had  headache intermittently over the last 1 week and would like to have evaluation of her elevated blood pressure.  Blood pressure was elevated 147/108 at home.  Is usually runs in the 120s.  Patient is ostensibly here for starting medication for hypertension.  Patient has not had vision changes weakness numbness and tingling chest arm or jaw pain but has had intermittent headache.  She has had headaches in the past.  She has had a good amount of fluid primarily water today at least 2-3 times today and has urinated.  Caffeine is an intake of roughly 12 ounces in the mornings alcohol is 1 alcoholic drink 2-3 times per week.  Nicotine is e-cigarettes and nicotine vaping.  No illicit street drug use.  Patient currently does not have a primary care provider established.  Patient states that the headache has been a 6 out of 10 and is more of a tension headache that is made better with sleep and is intermittent.  It is better currently in the office.    History obtained from chart review and the patient.    Problem List:  2017: S/P  section  2010-10: CARDIOVASCULAR SCREENING; LDL GOAL LESS THAN 160  2010: Goiter      Past Medical History:   Diagnosis Date    Goiter 2010    Hypertension 2022       Social History     Tobacco Use    Smoking status: Some Days     Current packs/day: 0.50     Average packs/day: 0.5 packs/day for 10.0 years (5.0 ttl pk-yrs)     Types: Cigarettes, Other     Passive exposure: Current    Smokeless tobacco: Current   Substance Use Topics    Alcohol use: Yes     Comment: 1-2 times per week       Review of Systems  As above in HPI otherwise negative.    Vitals:    24 1700   BP: (!) 142/90   Pulse: 84   Resp: 16   Temp: 98.1  F (36.7  C)   TempSrc: Oral   SpO2: 99%       General: Patient is resting comfortably no acute distress is afebrile  HEENT: Head is normocephalic atraumatic   eyes are PERRL EOMI sclera anicteric   TMs are clear bilaterally  Throat is with mild pharyngeal  wall erythema and no exudate  No cervical lymphadenopathy present  LUNGS: Clear to auscultation bilaterally  HEART: Regular rate and rhythm  Skin: Without rash non-diaphoretic    Physical Exam      Labs:  EKG, CBC, basic metabolic panel were declined in the office    At the end of the encounter, I discussed results, diagnosis, medications. Discussed red flags for immediate return to clinic/ER, as well as indications for follow up if no improvement. Patient understood and agreed to plan. Patient was stable for discharge.

## 2024-05-10 ENCOUNTER — OFFICE VISIT (OUTPATIENT)
Dept: FAMILY MEDICINE | Facility: CLINIC | Age: 34
End: 2024-05-10
Payer: COMMERCIAL

## 2024-05-10 ENCOUNTER — MYC MEDICAL ADVICE (OUTPATIENT)
Dept: FAMILY MEDICINE | Facility: CLINIC | Age: 34
End: 2024-05-10

## 2024-05-10 VITALS
HEIGHT: 63 IN | RESPIRATION RATE: 16 BRPM | BODY MASS INDEX: 40.2 KG/M2 | HEART RATE: 93 BPM | SYSTOLIC BLOOD PRESSURE: 130 MMHG | WEIGHT: 226.9 LBS | DIASTOLIC BLOOD PRESSURE: 82 MMHG | TEMPERATURE: 98.4 F | OXYGEN SATURATION: 97 %

## 2024-05-10 DIAGNOSIS — Z13.220 LIPID SCREENING: ICD-10-CM

## 2024-05-10 DIAGNOSIS — I10 PRIMARY HYPERTENSION: ICD-10-CM

## 2024-05-10 DIAGNOSIS — E66.01 MORBID OBESITY (H): ICD-10-CM

## 2024-05-10 DIAGNOSIS — Z11.4 ENCOUNTER FOR SCREENING FOR HIV: ICD-10-CM

## 2024-05-10 DIAGNOSIS — Z11.3 SCREEN FOR STD (SEXUALLY TRANSMITTED DISEASE): ICD-10-CM

## 2024-05-10 DIAGNOSIS — Z83.3 FH: DIABETES MELLITUS: ICD-10-CM

## 2024-05-10 DIAGNOSIS — Z00.00 ROUTINE GENERAL MEDICAL EXAMINATION AT A HEALTH CARE FACILITY: Primary | ICD-10-CM

## 2024-05-10 DIAGNOSIS — E66.01 MORBID OBESITY (H): Primary | ICD-10-CM

## 2024-05-10 DIAGNOSIS — Z11.59 ENCOUNTER FOR HEPATITIS C SCREENING TEST FOR LOW RISK PATIENT: ICD-10-CM

## 2024-05-10 DIAGNOSIS — R73.03 PREDIABETES: ICD-10-CM

## 2024-05-10 PROBLEM — Z98.891 S/P CESAREAN SECTION: Status: ACTIVE | Noted: 2017-05-01

## 2024-05-10 LAB
ERYTHROCYTE [DISTWIDTH] IN BLOOD BY AUTOMATED COUNT: 11.9 % (ref 10–15)
HBA1C MFR BLD: 5.8 % (ref 0–5.6)
HCT VFR BLD AUTO: 42.4 % (ref 35–47)
HGB BLD-MCNC: 13.9 G/DL (ref 11.7–15.7)
MCH RBC QN AUTO: 32.2 PG (ref 26.5–33)
MCHC RBC AUTO-ENTMCNC: 32.8 G/DL (ref 31.5–36.5)
MCV RBC AUTO: 98 FL (ref 78–100)
PLATELET # BLD AUTO: 274 10E3/UL (ref 150–450)
RBC # BLD AUTO: 4.32 10E6/UL (ref 3.8–5.2)
T PALLIDUM AB SER QL: NONREACTIVE
WBC # BLD AUTO: 9 10E3/UL (ref 4–11)

## 2024-05-10 PROCEDURE — 36415 COLL VENOUS BLD VENIPUNCTURE: CPT | Performed by: NURSE PRACTITIONER

## 2024-05-10 PROCEDURE — 84443 ASSAY THYROID STIM HORMONE: CPT | Performed by: NURSE PRACTITIONER

## 2024-05-10 PROCEDURE — 87591 N.GONORRHOEAE DNA AMP PROB: CPT | Performed by: NURSE PRACTITIONER

## 2024-05-10 PROCEDURE — 83036 HEMOGLOBIN GLYCOSYLATED A1C: CPT | Performed by: NURSE PRACTITIONER

## 2024-05-10 PROCEDURE — 99395 PREV VISIT EST AGE 18-39: CPT | Performed by: NURSE PRACTITIONER

## 2024-05-10 PROCEDURE — 87491 CHLMYD TRACH DNA AMP PROBE: CPT | Performed by: NURSE PRACTITIONER

## 2024-05-10 PROCEDURE — 86780 TREPONEMA PALLIDUM: CPT | Performed by: NURSE PRACTITIONER

## 2024-05-10 PROCEDURE — 85027 COMPLETE CBC AUTOMATED: CPT | Performed by: NURSE PRACTITIONER

## 2024-05-10 PROCEDURE — 80061 LIPID PANEL: CPT | Performed by: NURSE PRACTITIONER

## 2024-05-10 PROCEDURE — 80053 COMPREHEN METABOLIC PANEL: CPT | Performed by: NURSE PRACTITIONER

## 2024-05-10 PROCEDURE — 99214 OFFICE O/P EST MOD 30 MIN: CPT | Mod: 25 | Performed by: NURSE PRACTITIONER

## 2024-05-10 PROCEDURE — 86803 HEPATITIS C AB TEST: CPT | Performed by: NURSE PRACTITIONER

## 2024-05-10 PROCEDURE — 87389 HIV-1 AG W/HIV-1&-2 AB AG IA: CPT | Performed by: NURSE PRACTITIONER

## 2024-05-10 RX ORDER — LOSARTAN POTASSIUM 50 MG/1
50 TABLET ORAL DAILY
Qty: 30 TABLET | Refills: 1 | Status: SHIPPED | OUTPATIENT
Start: 2024-05-10 | End: 2024-07-22

## 2024-05-10 ASSESSMENT — PAIN SCALES - GENERAL: PAINLEVEL: SEVERE PAIN (7)

## 2024-05-10 NOTE — PROGRESS NOTES
Preventive Care Visit  [facility]  Kay Arroyo CNP, Nurse Practitioner Primary Care  May 10, 2024        Rafael Singh is a 33 year old, presenting for the following:  Physical (Discuss Hypertension, Declines Pap - goes to OB/GYN./Labs today- Not fasting)        5/10/2024     9:59 AM   Additional Questions   [facility]        Health Care Directive  Patient does not have a Health Care Directive or Living Will: Discussed advance care planning with patient; however, patient declined at this time.    HPI  Blood pressure has been running high- 140's/100 at home.  Under a lot of stress- going through a divorce.  Otherwise feeling well.         5/7/2024   General Health   How would you rate your overall physical health? (!) FAIR   Feel stress (tense, anxious, or unable to sleep) Only a little   (!) STRESS CONCERN      5/7/2024   Nutrition   Three or more servings of calcium each day? Yes   Diet: Regular (no restrictions)   How many servings of fruit and vegetables per day? (!) 2-3   How many sweetened beverages each day? 0-1         5/7/2024   Exercise   Days per week of moderate/strenous exercise 3 days   Average minutes spent exercising at this level 30 min         5/7/2024   Social Factors   Frequency of gathering with friends or relatives Once a week   Worry food won't last until get money to buy more No   Food not last or not have enough money for food? No   Do you have housing?  Yes   Are you worried about losing your housing? No   Lack of transportation? No   Unable to get utilities (heat,electricity)? No         5/7/2024   Dental   Dentist two times every year? Yes         5/7/2024   TB Screening   Were you born outside of the [place]? Yes       Today's PHQ-9 Score:       5/7/2024    11:28 AM   PHQ-9 SCORE   PHQ-9 Total Score MyChart 2 (Minimal depression)   PHQ-9 Total Score 2         5/7/2024   Substance Use   Alcohol more than 3/day or more than 7/wk No   Do you use any  other substances recreationally? No     Social History     Tobacco Use    Smoking status: Some Days     Current packs/day: 0.50     Average packs/day: 0.5 packs/day for 10.0 years (5.0 ttl pk-yrs)     Types: Cigarettes, Other     Passive exposure: Current    Smokeless tobacco: Current   Vaping Use    Vaping status: Some Days    Substances: Nicotine    Devices: Disposable, Pre-filled pod   Substance Use Topics    Alcohol use: Yes     Comment: 1-2 times per week    Drug use: No          Mammogram Screening - Patient under 40 years of age: Routine Mammogram Screening not recommended.           2024   One time HIV Screening   Previous HIV test? Yes         2024   STI Screening   New sexual partner(s) since last STI/HIV test? (!) YES      History of abnormal Pap smear: has paps done through gyn        Latest Ref Rng & Units 10/24/2022    12:35 PM   PAP / HPV   PAP  Atypical squamous cells of undetermined significance (ASC-US)    HPV 16 DNA Negative Negative    HPV 18 DNA Negative Negative    Other HR HPV Negative Negative            2024   Contraception/Family Planning   Questions about contraception or family planning No        Reviewed and updated as needed this visit by Provider       Med Hx  Surg Hx  Fam Hx            Past Medical History:   Diagnosis Date    Goiter 2010    Hypertension 2022     Past Surgical History:   Procedure Laterality Date     SECTION N/A 2017    Procedure: REPEAT  SECTION X 3;  Surgeon: Sukhi Hines MD;  Location: [facility];  Service:      SECTION  2009     SECTION  2012         Review of Systems  Constitutional, HEENT, cardiovascular, pulmonary, GI, , musculoskeletal, neuro, skin, endocrine and psych systems are negative, except as otherwise noted.     Objective    Exam  /82 (BP Location: Left arm, Patient Position: Sitting, Cuff Size: Adult Large)   Pulse 93   Temp 98.4  F (36.9  C) (Oral)   Resp 16   " Ht 1.588 m (5' 2.5\")   Wt 102.9 kg (226 lb 14.4 oz)   SpO2 97%   Breastfeeding No   BMI 40.84 kg/m     Estimated body mass index is 40.84 kg/m  as calculated from the following:    Height as of this encounter: 1.588 m (5' 2.5\").    Weight as of this encounter: 102.9 kg (226 lb 14.4 oz).    Physical Exam  GENERAL: alert and no distress  EYES: Eyes grossly normal to inspection, PERRL and conjunctivae and sclerae normal  HENT: ear canals and TM's normal, nose and mouth without ulcers or lesions  NECK: no adenopathy, no asymmetry, masses, or scars  RESP: lungs clear to auscultation - no rales, rhonchi or wheezes  CV: regular rate and rhythm, normal S1 S2, no S3 or S4, no murmur, click or rub, no peripheral edema  ABDOMEN: soft, nontender, no hepatosplenomegaly, no masses and bowel sounds normal  MS: no gross musculoskeletal defects noted, no edema  SKIN: no suspicious lesions or rashes  NEURO: Normal strength and tone, mentation intact and speech normal  PSYCH: mentation appears normal, affect normal/bright    A/P:  1. Routine general medical examination at a health care facility    2. Primary hypertension  Recheck BP in 1 month  - Comprehensive metabolic panel (BMP + Alb, Alk Phos, ALT, AST, Total. Bili, TP); Future  - CBC with platelets; Future  - losartan (COZAAR) 50 MG tablet; Take 1 tablet (50 mg) by mouth daily  Dispense: 30 tablet; Refill: 1  - Comprehensive metabolic panel (BMP + Alb, Alk Phos, ALT, AST, Total. Bili, TP)  - CBC with platelets    3. Lipid screening  - Lipid panel reflex to direct LDL Non-fasting; Future  - Lipid panel reflex to direct LDL Non-fasting    4. FH: diabetes mellitus  - Hemoglobin A1c; Future  - Hemoglobin A1c    5. Morbid obesity (H)  - TSH with free T4 reflex; Future  - Comprehensive metabolic panel (BMP + Alb, Alk Phos, ALT, AST, Total. Bili, TP); Future  - TSH with free T4 reflex  - Comprehensive metabolic panel (BMP + Alb, Alk Phos, ALT, AST, Total. Bili, TP)    6. Encounter " for hepatitis C screening test for low risk patient  - Hepatitis C antibody; Future  - Hepatitis C antibody    7. Encounter for screening for HIV  - HIV Antigen Antibody Combo; Future  - HIV Antigen Antibody Combo    8. Screen for STD (sexually transmitted disease)  - HIV Antigen Antibody Combo; Future  - Hepatitis C antibody; Future  - Chlamydia trachomatis/Neisseria gonorrhoeae by PCR - Clinic Collect  - Treponema Abs w Reflex to RPR and Titer; Future  - HIV Antigen Antibody Combo  - Hepatitis C antibody  - Treponema Abs w Reflex to RPR and Titer      Signed Electronically by: Kay Arroyo CNP    Answers submitted by the patient for this visit:  Patient Health Questionnaire (Submitted on 5/7/2024)  If you checked off any problems, how difficult have these problems made it for you to do your work, take care of things at home, or get along with other people?: Not difficult at all  PHQ9 TOTAL SCORE: 2  MESHA-7 (Submitted on 5/7/2024)  MESHA 7 TOTAL SCORE: 0

## 2024-05-10 NOTE — LETTER
May 14, 2024      Samantha Pompa  [address] [place] [address] [place] [id number]        To Whom It May Concern,       Samantha Pompa has been prescribed Ozempic for treatment of prediabetes and morbid obesity.  It is recommended that she take Ozepmic to help her lose weight and normalize her blood sugar level.         Thank you        Sincerely,        Kay Arroyo, CNP

## 2024-05-10 NOTE — PATIENT INSTRUCTIONS
"Preventive Care Advice   This is general advice we often give to help people stay healthy. Your care team may have specific advice just for you. Please talk to your care team about your own preventive care needs.  Lifestyle  Exercise at least 150 minutes each week (30 minutes a day, 5 days a week).  Do muscle strengthening activities 2 days a week. These help control your weight and prevent disease.  No smoking.  Wear sunscreen to prevent skin cancer.  Have your home tested for radon every 2 to 5 years. Radon is a colorless, odorless gas that can harm your lungs. To learn more, go to www.health.[facility].[place]. and search for \"Radon in Homes.\"  Keep guns unloaded and locked up in a safe place like a safe or gun vault, or, use a gun lock and hide the keys. Always lock away bullets separately. To learn more, visit [employer].[place].gov and search for \"safe gun storage.\"  Nutrition  Eat 5 or more servings of fruits and vegetables each day.  Try wheat bread, brown rice and whole grain pasta (instead of white bread, rice, and pasta).  Get enough calcium and vitamin D. Check the label on foods and aim for 100% of the RDA (recommended daily allowance).  Regular exams  Have a dental exam and cleaning every 6 months.  See your health care team every year to talk about:  Any changes in your health.  Any medicines your care team has prescribed.  Preventive care, family planning, and ways to prevent chronic diseases.  Shots (vaccines)   HPV shots (up to age 26), if you've never had them before.  Hepatitis B shots (up to age 59), if you've never had them before.  COVID-19 shot: Get this shot when it's due.  Flu shot: Get a flu shot every year.  Tetanus shot: Get a tetanus shot every 10 years.  Pneumococcal, hepatitis A, and RSV shots: Ask your care team if you need these based on your risk.  Shingles shot (for age 50 and up).  General health tests  Diabetes screening:  Starting at age 35, Get screened for diabetes at least every 3 years.  If " you are younger than age 35, ask your care team if you should be screened for diabetes.  Cholesterol test: At age 39, start having a cholesterol test every 5 years, or more often if advised.  Bone density scan (DEXA): At age 50, ask your care team if you should have this scan for osteoporosis (brittle bones).  Hepatitis C: Get tested at least once in your life.  Abdominal aortic aneurysm screening: Talk to your doctor about having this screening if you:  Have ever smoked; and  Are biologically male; and  Are between the ages of 65 and 75.  STIs (sexually transmitted infections)  Before age 24: Ask your care team if you should be screened for STIs.  After age 24: Get screened for STIs if you're at risk. You are at risk for STIs (including HIV) if:  You are sexually active with more than one person.  You don't use condoms every time.  You or a partner was diagnosed with a sexually transmitted infection.  If you are at risk for HIV, ask about PrEP medicine to prevent HIV.  Get tested for HIV at least once in your life, whether you are at risk for HIV or not.  Cancer screening tests  Cervical cancer screening: If you have a cervix, begin getting regular cervical cancer screening tests at age 21. Most people who have regular screenings with normal results can stop after age 65. Talk about this with your provider.  Breast cancer scan (mammogram): If you've ever had breasts, begin having regular mammograms starting at age 40. This is a scan to check for breast cancer.  Colon cancer screening: It is important to start screening for colon cancer at age 45.  Have a colonoscopy test every 10 years (or more often if you're at risk) Or, ask your provider about stool tests like a FIT test every year or Cologuard test every 3 years.  To learn more about your testing options, visit: www.[employer]/394918.pdf.  For help making a decision, visit: noah/gz85668.  Prostate cancer screening test: If you have a prostate and are age 55  to 69, ask your provider if you would benefit from a yearly prostate cancer screening test.  Lung cancer screening: If you are a current or former smoker age 50 to 80, ask your care team if ongoing lung cancer screenings are right for you.  For informational purposes only. Not to replace the advice of your health care provider. Copyright   2023 [employer]. All rights reserved. Clinically reviewed by the [facility] Transitions Program. [employer] 927498 - REV 04/24.

## 2024-05-11 LAB
ALBUMIN SERPL BCG-MCNC: 4.1 G/DL (ref 3.5–5.2)
ALP SERPL-CCNC: 69 U/L (ref 40–150)
ALT SERPL W P-5'-P-CCNC: 28 U/L (ref 0–50)
ANION GAP SERPL CALCULATED.3IONS-SCNC: 10 MMOL/L (ref 7–15)
AST SERPL W P-5'-P-CCNC: 26 U/L (ref 0–45)
BILIRUB SERPL-MCNC: 0.5 MG/DL
BUN SERPL-MCNC: 8.7 MG/DL (ref 6–20)
C TRACH DNA SPEC QL PROBE+SIG AMP: NEGATIVE
CALCIUM SERPL-MCNC: 8.7 MG/DL (ref 8.6–10)
CHLORIDE SERPL-SCNC: 108 MMOL/L (ref 98–107)
CHOLEST SERPL-MCNC: 158 MG/DL
CREAT SERPL-MCNC: 0.7 MG/DL (ref 0.51–0.95)
DEPRECATED HCO3 PLAS-SCNC: 22 MMOL/L (ref 22–29)
EGFRCR SERPLBLD CKD-EPI 2021: >90 ML/MIN/1.73M2
FASTING STATUS PATIENT QL REPORTED: ABNORMAL
FASTING STATUS PATIENT QL REPORTED: ABNORMAL
GLUCOSE SERPL-MCNC: 120 MG/DL (ref 70–99)
HCV AB SERPL QL IA: NONREACTIVE
HDLC SERPL-MCNC: 49 MG/DL
HIV 1+2 AB+HIV1 P24 AG SERPL QL IA: NONREACTIVE
LDLC SERPL CALC-MCNC: 84 MG/DL
N GONORRHOEA DNA SPEC QL NAA+PROBE: NEGATIVE
NONHDLC SERPL-MCNC: 109 MG/DL
POTASSIUM SERPL-SCNC: 4.1 MMOL/L (ref 3.4–5.3)
PROT SERPL-MCNC: 7.2 G/DL (ref 6.4–8.3)
SODIUM SERPL-SCNC: 140 MMOL/L (ref 135–145)
TRIGL SERPL-MCNC: 126 MG/DL
TSH SERPL DL<=0.005 MIU/L-ACNC: 1.09 UIU/ML (ref 0.3–4.2)

## 2024-05-14 ENCOUNTER — TELEPHONE (OUTPATIENT)
Dept: FAMILY MEDICINE | Facility: CLINIC | Age: 34
End: 2024-05-14
Payer: COMMERCIAL

## 2024-05-14 NOTE — TELEPHONE ENCOUNTER
PA Initiation    Medication: Semaglutide 2MG/3ML pen  Insurance Company:  [employer]  Pharmacy Filling the Rx:  [facility]  Filling Pharmacy Phone:  [phone]  Filling Pharmacy Fax:  [phone]  Start Date:  05/14/2024

## 2024-05-18 ENCOUNTER — OFFICE VISIT (OUTPATIENT)
Dept: FAMILY MEDICINE | Facility: CLINIC | Age: 34
End: 2024-05-18
Payer: COMMERCIAL

## 2024-05-18 VITALS
OXYGEN SATURATION: 95 % | RESPIRATION RATE: 16 BRPM | DIASTOLIC BLOOD PRESSURE: 101 MMHG | TEMPERATURE: 98 F | HEART RATE: 92 BPM | SYSTOLIC BLOOD PRESSURE: 149 MMHG

## 2024-05-18 DIAGNOSIS — S09.91XA TRAUMA OF EAR CANAL, INITIAL ENCOUNTER: Primary | ICD-10-CM

## 2024-05-18 PROCEDURE — 69200 CLEAR OUTER EAR CANAL: CPT

## 2024-05-18 RX ORDER — NEOMYCIN SULFATE, POLYMYXIN B SULFATE, HYDROCORTISONE 3.5; 10000; 1 MG/ML; [USP'U]/ML; MG/ML
3 SOLUTION/ DROPS AURICULAR (OTIC) 4 TIMES DAILY
Qty: 10 ML | Refills: 0 | Status: SHIPPED | OUTPATIENT
Start: 2024-05-18 | End: 2024-08-02

## 2024-05-18 NOTE — PROGRESS NOTES
Assessment & Plan       ICD-10-CM    1. Trauma of ear canal, initial encounter  S09.91XA neomycin-polymyxin-hydrocortisone (CORTISPORIN) 3.5-00021-3 otic solution         Fragment of false nail visible in deep ear canal. Removed with irrigation followed by alligator clip, operative head otoscope and magnifying glasses. Full fragment was removed, no foreign bodies remaining in ear canal. It was stuck quite firmly, so I do anticipate there will be swelling and pain over the next few days. Given the nature of the foreign body and the trauma to the canal, will do cortisporin drops. Visualized TM after the procedure and it is intact.     Follow up with primary care provider with any problems, questions or concerns or if symptoms worsen or fail to improve. Patient agreed to plan and verbalized understanding.     Rafael Singh is a 33 year old female who presents to clinic today with her  for the following health issues:  Chief Complaint   Patient presents with    Foreign Body in Ear     Fake nail left ear      HPI    Pinky nail, synthetic, broke off in L ear, just from scratching.     Review of Systems    10 point ROS performed and negative except as noted in HPI.     Problem List:  2017: S/P  section  2010-10: CARDIOVASCULAR SCREENING; LDL GOAL LESS THAN 160  2010: Goiter      Past Medical History:   Diagnosis Date    Goiter 2010    Hypertension 2022       Social History     Tobacco Use    Smoking status: Some Days     Current packs/day: 0.50     Average packs/day: 0.5 packs/day for 10.0 years (5.0 ttl pk-yrs)     Types: Cigarettes, Other     Passive exposure: Current    Smokeless tobacco: Current   Substance Use Topics    Alcohol use: Yes     Comment: 1-2 times per week           Objective    BP (!) 149/101   Pulse 92   Temp 98  F (36.7  C) (Oral)   Resp 16   SpO2 95%   Physical Exam   Constitutional:       General: Patient is not in acute distress.     Appearance: Normal  appearance.   HENT:      Head: Normocephalic and atraumatic.      Right Ear: External ear normal.      Left Ear: External ear normal. Internal ear with some erythema and small amt of blood, false nail visible superoposterior canal close to the canal.     Nose: No congestion or rhinorrhea.      Mouth/Throat:      Mouth: Mucous membranes are moist.      Pharynx: Oropharynx is clear. No oropharyngeal exudate.   Eyes:      General: No scleral icterus.     Extraocular Movements: Extraocular movements intact.      Conjunctiva/sclera: Conjunctivae normal.      Pupils: Pupils are equal, round, and reactive to light.   Pulmonary:      Effort: Pulmonary effort is normal.   Cardiovascular:      Regular heart rate  Abdominal:      General: Abdomen is flat.   Musculoskeletal:         General: No swelling or deformity. Normal range of motion.      Cervical back: Normal range of motion and neck supple.   Skin:     General: Skin is warm and dry.      Coloration: Skin is not jaundiced.      Findings: No bruising, lesion or rash.   Neurological:      General: No focal deficit present.      Mental Status: Patient is alert. Mental status is at baseline.   Psychiatric:         Mood and Affect: Mood normal.         Behavior: Behavior normal.         Thought Content: Thought content normal.       Yvette Sarmiento MD

## 2024-05-23 ENCOUNTER — MYC MEDICAL ADVICE (OUTPATIENT)
Dept: FAMILY MEDICINE | Facility: CLINIC | Age: 34
End: 2024-05-23
Payer: COMMERCIAL

## 2024-05-28 NOTE — TELEPHONE ENCOUNTER
PA Initiation    Medication: OZEMPIC (0.25 OR 0.5 MG/DOSE) 2 MG/3ML SC SOPN  Insurance Company: [employer] - Phone [phone] Fax [phone]  Pharmacy Filling the Rx: [id number] - [place], [place] - [address]  Filling Pharmacy Phone: [phone]  Filling Pharmacy Fax: [phone]  Start Date: 5/27/2024        Note: Due to record-high volumes, our turn-around time is taking longer than usual . We are currently 2 weeks behind in the pools.   We are working diligently to submit all requests in a timely manner and in the order they are received. Please only flag TRUE URGENT requests as high priority to the pool at this time.   If you have questions on status of PA's,  please send a note/message in the active PA encounter and send back to the [facility] PA pool [[phone]].    If you have questions about the turn-around time or about our process, please reach out to our supervisor Irasema Perez.   Thank you!   RPPA (Retail Pharmacy Prior Authorization) team

## 2024-05-29 NOTE — TELEPHONE ENCOUNTER
PRIOR AUTHORIZATION DENIED    Medication: OZEMPIC (0.25 OR 0.5 MG/DOSE) 2 MG/3ML SC SOPN  Insurance Company: [employer] - Phone [phone] Fax [phone]  Denial Date: 5/29/2024  Denial Reason(s):       Appeal Information:       Patient Notified: NO

## 2024-05-29 NOTE — TELEPHONE ENCOUNTER
Left message to call back for: Patient   Information to relay to patient: To return call regarding the Prior Authorization for Ozempic was denied.  Emerald Blount on 5/29/2024 at 6:02 PM

## 2024-05-29 NOTE — TELEPHONE ENCOUNTER
Left message to call back for: patient  Information to relay to patient: Ozempic denied. Not an option at this time unfortunately.     75

## 2024-07-22 ENCOUNTER — TRANSFERRED RECORDS (OUTPATIENT)
Dept: HEALTH INFORMATION MANAGEMENT | Facility: CLINIC | Age: 34
End: 2024-07-22
Payer: COMMERCIAL

## 2024-07-22 DIAGNOSIS — I10 PRIMARY HYPERTENSION: ICD-10-CM

## 2024-07-22 RX ORDER — LOSARTAN POTASSIUM 50 MG/1
50 TABLET ORAL DAILY
Qty: 30 TABLET | Refills: 1 | Status: SHIPPED | OUTPATIENT
Start: 2024-07-22 | End: 2024-08-02

## 2024-08-02 ENCOUNTER — OFFICE VISIT (OUTPATIENT)
Dept: FAMILY MEDICINE | Facility: CLINIC | Age: 34
End: 2024-08-02
Payer: COMMERCIAL

## 2024-08-02 VITALS
OXYGEN SATURATION: 98 % | WEIGHT: 221.1 LBS | HEART RATE: 85 BPM | HEIGHT: 63 IN | RESPIRATION RATE: 16 BRPM | TEMPERATURE: 98.1 F | BODY MASS INDEX: 39.18 KG/M2 | DIASTOLIC BLOOD PRESSURE: 78 MMHG | SYSTOLIC BLOOD PRESSURE: 138 MMHG

## 2024-08-02 DIAGNOSIS — K82.9 GALLBLADDER DISEASE: ICD-10-CM

## 2024-08-02 DIAGNOSIS — I10 PRIMARY HYPERTENSION: ICD-10-CM

## 2024-08-02 DIAGNOSIS — R79.89 ELEVATED LIVER FUNCTION TESTS: ICD-10-CM

## 2024-08-02 DIAGNOSIS — Z01.818 PREOP GENERAL PHYSICAL EXAM: Primary | ICD-10-CM

## 2024-08-02 DIAGNOSIS — E65 ABDOMINAL PANNUS: ICD-10-CM

## 2024-08-02 DIAGNOSIS — K76.0 NAFLD (NONALCOHOLIC FATTY LIVER DISEASE): ICD-10-CM

## 2024-08-02 DIAGNOSIS — N64.81 PTOSIS OF BOTH BREASTS: ICD-10-CM

## 2024-08-02 LAB
ALBUMIN SERPL BCG-MCNC: 4.3 G/DL (ref 3.5–5.2)
ALBUMIN UR-MCNC: NEGATIVE MG/DL
ALP SERPL-CCNC: 74 U/L (ref 40–150)
ALT SERPL W P-5'-P-CCNC: 114 U/L (ref 0–50)
ANION GAP SERPL CALCULATED.3IONS-SCNC: 10 MMOL/L (ref 7–15)
APPEARANCE UR: CLEAR
AST SERPL W P-5'-P-CCNC: 66 U/L (ref 0–45)
ATRIAL RATE - MUSE: 85 BPM
BILIRUB SERPL-MCNC: 0.8 MG/DL
BILIRUB UR QL STRIP: NEGATIVE
BUN SERPL-MCNC: 4.8 MG/DL (ref 6–20)
CALCIUM SERPL-MCNC: 9.1 MG/DL (ref 8.8–10.4)
CHLORIDE SERPL-SCNC: 105 MMOL/L (ref 98–107)
COLOR UR AUTO: YELLOW
CREAT SERPL-MCNC: 0.67 MG/DL (ref 0.51–0.95)
DIASTOLIC BLOOD PRESSURE - MUSE: NORMAL MMHG
EGFRCR SERPLBLD CKD-EPI 2021: >90 ML/MIN/1.73M2
ERYTHROCYTE [DISTWIDTH] IN BLOOD BY AUTOMATED COUNT: 12.1 % (ref 10–15)
GLUCOSE SERPL-MCNC: 109 MG/DL (ref 70–99)
GLUCOSE UR STRIP-MCNC: NEGATIVE MG/DL
HBA1C MFR BLD: 6 % (ref 0–5.6)
HCG INTACT+B SERPL-ACNC: <1 MIU/ML
HCO3 SERPL-SCNC: 23 MMOL/L (ref 22–29)
HCT VFR BLD AUTO: 44.3 % (ref 35–47)
HGB BLD-MCNC: 14.4 G/DL (ref 11.7–15.7)
HGB UR QL STRIP: NEGATIVE
HIV 1+2 AB+HIV1 P24 AG SERPL QL IA: NONREACTIVE
INR PPP: 1.05 (ref 0.85–1.15)
INTERPRETATION ECG - MUSE: NORMAL
KETONES UR STRIP-MCNC: NEGATIVE MG/DL
LEUKOCYTE ESTERASE UR QL STRIP: NEGATIVE
MCH RBC QN AUTO: 32.1 PG (ref 26.5–33)
MCHC RBC AUTO-ENTMCNC: 32.5 G/DL (ref 31.5–36.5)
MCV RBC AUTO: 99 FL (ref 78–100)
NITRATE UR QL: NEGATIVE
P AXIS - MUSE: 27 DEGREES
PH UR STRIP: 6 [PH] (ref 5–8)
PLATELET # BLD AUTO: 278 10E3/UL (ref 150–450)
POTASSIUM SERPL-SCNC: 4 MMOL/L (ref 3.4–5.3)
PR INTERVAL - MUSE: 168 MS
PROT SERPL-MCNC: 7.7 G/DL (ref 6.4–8.3)
QRS DURATION - MUSE: 90 MS
QT - MUSE: 388 MS
QTC - MUSE: 461 MS
R AXIS - MUSE: 54 DEGREES
RBC # BLD AUTO: 4.49 10E6/UL (ref 3.8–5.2)
SODIUM SERPL-SCNC: 138 MMOL/L (ref 135–145)
SP GR UR STRIP: <=1.005 (ref 1–1.03)
SYSTOLIC BLOOD PRESSURE - MUSE: NORMAL MMHG
T AXIS - MUSE: 25 DEGREES
T3FREE SERPL-MCNC: 3.9 PG/ML (ref 2–4.4)
T4 FREE SERPL-MCNC: 1.25 NG/DL (ref 0.9–1.7)
TSH SERPL DL<=0.005 MIU/L-ACNC: 1.47 UIU/ML (ref 0.3–4.2)
UROBILINOGEN UR STRIP-ACNC: 0.2 E.U./DL
VENTRICULAR RATE- MUSE: 85 BPM
WBC # BLD AUTO: 10 10E3/UL (ref 4–11)

## 2024-08-02 PROCEDURE — 85027 COMPLETE CBC AUTOMATED: CPT | Performed by: NURSE PRACTITIONER

## 2024-08-02 PROCEDURE — 93010 ELECTROCARDIOGRAM REPORT: CPT | Performed by: INTERNAL MEDICINE

## 2024-08-02 PROCEDURE — 99214 OFFICE O/P EST MOD 30 MIN: CPT | Performed by: NURSE PRACTITIONER

## 2024-08-02 PROCEDURE — 84481 FREE ASSAY (FT-3): CPT | Performed by: NURSE PRACTITIONER

## 2024-08-02 PROCEDURE — 85610 PROTHROMBIN TIME: CPT | Performed by: NURSE PRACTITIONER

## 2024-08-02 PROCEDURE — 81003 URINALYSIS AUTO W/O SCOPE: CPT | Performed by: NURSE PRACTITIONER

## 2024-08-02 PROCEDURE — 84702 CHORIONIC GONADOTROPIN TEST: CPT | Performed by: NURSE PRACTITIONER

## 2024-08-02 PROCEDURE — 93005 ELECTROCARDIOGRAM TRACING: CPT | Performed by: NURSE PRACTITIONER

## 2024-08-02 PROCEDURE — 36415 COLL VENOUS BLD VENIPUNCTURE: CPT | Performed by: NURSE PRACTITIONER

## 2024-08-02 PROCEDURE — 84443 ASSAY THYROID STIM HORMONE: CPT | Performed by: NURSE PRACTITIONER

## 2024-08-02 PROCEDURE — 87389 HIV-1 AG W/HIV-1&-2 AB AG IA: CPT | Performed by: NURSE PRACTITIONER

## 2024-08-02 PROCEDURE — 84439 ASSAY OF FREE THYROXINE: CPT | Performed by: NURSE PRACTITIONER

## 2024-08-02 PROCEDURE — 80053 COMPREHEN METABOLIC PANEL: CPT | Performed by: NURSE PRACTITIONER

## 2024-08-02 PROCEDURE — 83036 HEMOGLOBIN GLYCOSYLATED A1C: CPT | Performed by: NURSE PRACTITIONER

## 2024-08-02 RX ORDER — LOSARTAN POTASSIUM 50 MG/1
50 TABLET ORAL DAILY
Qty: 90 TABLET | Refills: 1 | Status: SHIPPED | OUTPATIENT
Start: 2024-08-02

## 2024-08-02 ASSESSMENT — PAIN SCALES - GENERAL: PAINLEVEL: NO PAIN (0)

## 2024-08-02 NOTE — PATIENT INSTRUCTIONS

## 2024-08-02 NOTE — PROGRESS NOTES
Preoperative Evaluation  [facility]  [address]  [place] [facility] [address]  Phone: [phone]  Fax: [phone]  Primary Provider: Physician No Ref-Primary  Pre-op Performing Provider: Kay Arroyo CNP  Aug 2, 2024             8/1/2024   Surgical Information   What procedure is being done? ABDOMINOPLASTY WITH TWO AREAS OF LIPOSUCTION (WAIST FLANKS) WITH BREAST LIFT-MASTOPEXY WITH SILICONE IMPLANTS   Facility or Hospital where procedure/surgery will be performed: [facility]   Who is doing the procedure / surgery? Dr. Farhat Thomas   Date of surgery / procedure: 08/23/2024   Time of surgery / procedure: 8:00 am   Where do you plan to recover after surgery? at home with family        Fax number for surgical facility: [phone]      Rafael Singh is a 33 year old, presenting for the following:  Pre-Op Exam (08/23/2024 - Abdominoplasty ) and Recheck Medication          8/2/2024    10:11 AM   Additional Questions   [facility]     HPI related to upcoming procedure: having plastic surgery done        8/1/2024   Pre-Op Questionnaire   Have you ever had a heart attack or stroke? No   Have you ever had surgery on your heart or blood vessels, such as a stent placement, a coronary artery bypass, or surgery on an artery in your head, neck, heart, or legs? No   Do you have chest pain with activity? No   Do you have a history of heart failure? No   Do you currently have a cold, bronchitis or symptoms of other infection? No   Do you have a cough, shortness of breath, or wheezing? No   Do you or anyone in your family have previous history of blood clots? No   Do you or does anyone in your family have a serious bleeding problem such as prolonged bleeding following surgeries or cuts? No   Have you ever had problems with anemia or been told to take iron pills? No   Have you had any abnormal blood loss such as black, tarry or  bloody stools, or abnormal vaginal bleeding? No   Have you ever had a blood transfusion? No   Are you willing to have a blood transfusion if it is medically needed before, during, or after your surgery? Yes   Have you or any of your relatives ever had problems with anesthesia? No   Do you have sleep apnea, excessive snoring or daytime drowsiness? (!) UNKNOWN   Do you have any artifical heart valves or other implanted medical devices like a pacemaker, defibrillator, or continuous glucose monitor? No   Do you have artificial joints? No   Are you allergic to latex? No        Health Care Directive  Patient does not have a Health Care Directive or Living Will: Discussed advance care planning with patient; however, patient declined at this time.    Preoperative Review of    reviewed - no record of controlled substances prescribed.      Status of Chronic Conditions:  HYPERTENSION - Patient has longstanding history of HTN , currently denies any symptoms referable to elevated blood pressure. Specifically denies chest pain, palpitations, dyspnea, orthopnea, PND or peripheral edema. Blood pressure readings have been in normal range. Current medication regimen is as listed below. Patient denies any side effects of medication.     Patient Active Problem List    Diagnosis Date Noted    S/P  section 2017     Priority: Medium    Goiter 2010     Priority: Medium      Past Medical History:   Diagnosis Date    Goiter 2010    Hypertension 2022     Past Surgical History:   Procedure Laterality Date     SECTION N/A 2017    Procedure: REPEAT  SECTION X 3;  Surgeon: Sukhi Hines MD;  Location: [facility];  Service:      SECTION  2009     SECTION  2012     Current Outpatient Medications   Medication Sig Dispense Refill    losartan (COZAAR) 50 MG tablet TAKE 1 TABLET BY MOUTH EVERY DAY 30 tablet 1    neomycin-polymyxin-hydrocortisone (CORTISPORIN)  3.5-92102-1 otic solution Place 3 drops in ear(s) 4 times daily 10 mL 0    semaglutide (OZEMPIC) 2 MG/3ML pen Inject 0.25 mg Subcutaneous every 7 days 3 mL 0    semaglutide (OZEMPIC) 2 MG/3ML pen Inject 0.5 mg Subcutaneous every 7 days 3 mL 0    Semaglutide, 1 MG/DOSE, (OZEMPIC) 4 MG/3ML pen Inject 1 mg Subcutaneous every 7 days 3 mL 0       No Known Allergies     Social History     Tobacco Use    Smoking status: Former     Current packs/day: 0.50     Average packs/day: 0.5 packs/day for 10.0 years (5.0 ttl pk-yrs)     Types: Cigarettes, Other     Passive exposure: Past    Smokeless tobacco: Current   Substance Use Topics    Alcohol use: Yes     Comment: 1-2 times per week     Family History   Problem Relation Age of Onset    Diabetes Mother         age 59    Hypertension Mother     Cerebrovascular Disease Mother     Kidney Disease Mother     Diabetes Father     Coronary Artery Disease Father     Hypertension Father     Kidney Disease Father     Pulmonary Embolism Sister     Myocardial Infarction Sister     Diabetes Brother         age 30    Kidney failure Brother     Heart Disease Brother     Hypertension Brother     Hypertension Maternal Grandmother     Hypertension Paternal Grandfather      History   Drug Use No         Review of Systems  CONSTITUTIONAL: NEGATIVE for fever, chills, unexplained change in weight  INTEGUMENTARY/SKIN: NEGATIVE for worrisome rashes, moles or lesions  EYES: NEGATIVE for vision changes or irritation  ENT/MOUTH: NEGATIVE for ear, mouth and throat problems  RESP: NEGATIVE for significant cough or SOB  BREAST: NEGATIVE for masses, tenderness or discharge  CV: NEGATIVE for chest pain, palpitations or peripheral edema  GI: NEGATIVE for nausea, abdominal pain, heartburn.  + constipation   : NEGATIVE for frequency, dysuria, or hematuria  MUSCULOSKELETAL: NEGATIVE for significant arthralgias or myalgia  NEURO: NEGATIVE for weakness, dizziness or paresthesias  ENDOCRINE: NEGATIVE for  "temperature intolerance, skin/hair changes  HEME: NEGATIVE for bleeding problems  PSYCHIATRIC: NEGATIVE for changes in mood or affect    Objective    /78 (BP Location: Left arm, Patient Position: Sitting, Cuff Size: Adult Regular)   Pulse 85   Temp 98.1  F (36.7  C) (Oral)   Resp 16   Ht 1.588 m (5' 2.5\")   Wt 100.3 kg (221 lb 1.6 oz)   SpO2 98%   BMI 39.80 kg/m     Estimated body mass index is 39.8 kg/m  as calculated from the following:    Height as of this encounter: 1.588 m (5' 2.5\").    Weight as of this encounter: 100.3 kg (221 lb 1.6 oz).  Physical Exam  GENERAL: alert and no distress  EYES: Eyes grossly normal to inspection, PERRL and conjunctivae and sclerae normal  HEENT: ear canals and TM's normal, nose and mouth without ulcers or lesions  NECK: no adenopathy, no asymmetry, masses, or scars  RESP: lungs clear to auscultation - no rales, rhonchi or wheezes  CV: regular rate and rhythm, normal S1 S2, no S3 or S4, no murmur, click or rub, no peripheral edema  ABDOMEN: soft, nontender, no hepatosplenomegaly, no masses and bowel sounds normal  MS: no gross musculoskeletal defects noted, no edema  SKIN: no suspicious lesions or rashes  NEURO: Normal strength and tone, mentation intact and speech normal  PSYCH: mentation appears normal, affect normal/bright    Recent Labs   Lab Test 05/10/24  1044   HGB 13.9         POTASSIUM 4.1   CR 0.70   A1C 5.8*        Diagnostics  Labs pending at this time.  Results will be reviewed when available.   EKG: appears normal, NSR    Revised Cardiac Risk Index (RCRI)  The patient has the following serious cardiovascular risks for perioperative complications:   - No serious cardiac risks = 0 points     RCRI Interpretation: 1 point: Class II (low risk - 0.9% complication rate)     A/P:  1. Preop general physical exam  Cleared for surgery.  Patient is at low risk for surgery and there is no contradiction to proceed with procedures.   - Comprehensive " metabolic panel (BMP + Alb, Alk Phos, ALT, AST, Total. Bili, TP); Future  - CBC with platelets; Future  - INR; Future  - HIV Antigen Antibody Combo; Future  - Hemoglobin A1c; Future  - TSH; Future  - T4, free; Future  - T3, Free; Future  - UA Macroscopic with reflex to Microscopic and Culture - Lab Collect; Future  - US Breast Left Limited 1-3 Quadrants; Future  - US Breast Right Limited 1-3 Quadrants; Future  - EKG 12-lead, tracing only  - HCG quantitative pregnancy; Future    2. Abdominal pannus  Surgery as scheduled    3. Ptosis of both breasts  Surgery as scheduled    4. Primary hypertension  Stable  - losartan (COZAAR) 50 MG tablet; Take 1 tablet (50 mg) by mouth daily  Dispense: 90 tablet; Refill: 1    Signed Electronically by: Kay Arroyo CNP  A copy of this evaluation report is provided to the requesting physician.

## 2024-08-05 ENCOUNTER — MYC MEDICAL ADVICE (OUTPATIENT)
Dept: FAMILY MEDICINE | Facility: CLINIC | Age: 34
End: 2024-08-05
Payer: COMMERCIAL

## 2024-08-05 DIAGNOSIS — Z01.818 PREOP GENERAL PHYSICAL EXAM: Primary | ICD-10-CM

## 2024-08-06 NOTE — TELEPHONE ENCOUNTER
INR was already done.  Order placed for PTT- patient will need to be redrawn.  Please check with lab if there's a separate option for PT- I only see INR/PT.

## 2024-08-08 ENCOUNTER — LAB (OUTPATIENT)
Dept: LAB | Facility: CLINIC | Age: 34
End: 2024-08-08
Payer: COMMERCIAL

## 2024-08-08 ENCOUNTER — HOSPITAL ENCOUNTER (OUTPATIENT)
Dept: ULTRASOUND IMAGING | Facility: HOSPITAL | Age: 34
Discharge: HOME | End: 2024-08-08
Attending: NURSE PRACTITIONER
Payer: COMMERCIAL

## 2024-08-08 ENCOUNTER — ANCILLARY PROCEDURE (OUTPATIENT)
Dept: MAMMOGRAPHY | Facility: CLINIC | Age: 34
End: 2024-08-08
Attending: NURSE PRACTITIONER
Payer: COMMERCIAL

## 2024-08-08 DIAGNOSIS — R79.89 ELEVATED LIVER FUNCTION TESTS: ICD-10-CM

## 2024-08-08 DIAGNOSIS — Z01.818 PREOP GENERAL PHYSICAL EXAM: ICD-10-CM

## 2024-08-08 PROBLEM — K76.0 NAFLD (NONALCOHOLIC FATTY LIVER DISEASE): Status: ACTIVE | Noted: 2024-08-08

## 2024-08-08 LAB — APTT PPP: 29 SECONDS (ref 22–38)

## 2024-08-08 PROCEDURE — 77066 DX MAMMO INCL CAD BI: CPT

## 2024-08-08 PROCEDURE — 36415 COLL VENOUS BLD VENIPUNCTURE: CPT

## 2024-08-08 PROCEDURE — 76705 ECHO EXAM OF ABDOMEN: CPT

## 2024-08-08 PROCEDURE — 85730 THROMBOPLASTIN TIME PARTIAL: CPT

## 2024-08-08 NOTE — TELEPHONE ENCOUNTER
Looks like patient had a PT/INR on 8/2. Not seeing a specific lab for just a PT, can we double check with lab?     PTT completed on 8/8.

## 2024-08-09 ENCOUNTER — TELEPHONE (OUTPATIENT)
Dept: FAMILY MEDICINE | Facility: CLINIC | Age: 34
End: 2024-08-09
Payer: COMMERCIAL

## 2024-08-09 ENCOUNTER — TELEPHONE (OUTPATIENT)
Dept: SURGERY | Facility: CLINIC | Age: 34
End: 2024-08-09
Payer: COMMERCIAL

## 2024-08-09 DIAGNOSIS — R79.89 ELEVATED LIVER FUNCTION TESTS: ICD-10-CM

## 2024-08-09 DIAGNOSIS — K82.9 GALLBLADDER DISEASE: Primary | ICD-10-CM

## 2024-08-09 NOTE — PROGRESS NOTES
Forms with pre-op and labs faxed to [facility] at [phone].    Rupinder Estrada CMA.

## 2024-08-09 NOTE — TELEPHONE ENCOUNTER
Patient called concerned about gallbladder consultation as she was scheduled to go to [place] for an abdominoplasty later this month. I discussed that the gallbladder should take priority before an abdominoplasty. I encouraged her to keep Monday appointment and postpone abdominoplasty.       Linda Traore RN on 8/9/2024 at 11:52 AM

## 2024-08-09 NOTE — TELEPHONE ENCOUNTER
----- Message from Kay Arroyo sent at 8/8/2024  7:05 PM CDT -----  Please call patient- U/S shows gallbladder disease and fatty liver disease.  Recommend consult with surgeon for gallbladder.  Patient ed information added to AVS about fatty liver disease.  It's importance that she changes her lifestyle:  low-carb diet, limit processed foods, avoid alcohol.  Weight loss and regular exercise.  Some insurances will cover Wegovy and other weight loss medications if patient has fatty liver disease.  Recheck liver tests in 3-4months.

## 2024-08-09 NOTE — TELEPHONE ENCOUNTER
Spoke to patient who is requesting to have a new medical clearance form completed and Faxed in.  Patient states that it is already being worked on. Patient requesting follow up on this.    ISAAC Avalos RN  [facility]

## 2024-08-09 NOTE — TELEPHONE ENCOUNTER
"Pt contacted. She stated that [place] staff will perform the INR/PT testing.     Paperwork from surgeon's office needs ot be signed by provider OR preop note needs to state \" You are at low risk for surgery and there is not contradiction to proceed with your procedure\".     To provider for review.     Once completed, fax to patient and surgeon's office.   Fax [phone] (patient)  "

## 2024-08-09 NOTE — TELEPHONE ENCOUNTER
Angelika Suárez RN called Samantha on 8//9 and left a message to return the call to the clinic.  If patient calls back, please route to [facility].    TC please route to RN.    ISAAC Avalos RN  [employer]

## 2024-08-09 NOTE — TELEPHONE ENCOUNTER
Spoke to patient and relayed message from provider.  Patient verbalized understanding and agrees with plan.  Scheduled labs in 3 months.  She does need labs ordered.    JING AvalosN RN  [facility]

## 2024-08-12 ENCOUNTER — OFFICE VISIT (OUTPATIENT)
Dept: SURGERY | Facility: CLINIC | Age: 34
End: 2024-08-12
Attending: NURSE PRACTITIONER
Payer: COMMERCIAL

## 2024-08-12 DIAGNOSIS — K82.9 GALLBLADDER DISEASE: ICD-10-CM

## 2024-08-12 PROCEDURE — 99204 OFFICE O/P NEW MOD 45 MIN: CPT | Performed by: SURGERY

## 2024-08-12 NOTE — PROGRESS NOTES
HPI: Smaantha Pompa is a 33 year old female referred to see me by Kay Arroyo for incidental cholelithiasis.  He was seen as an outpatient and noted to have elevated LFTs with no biliary colic signs.  She underwent subsequent ultrasound which demonstrated a large gallstone.  She denies any history of biliary colic or difficulty eating.  She is concerned about her gallstone and does not want surgery at this time because she has an abdominoplasty scheduled for next week in [place].    Allergies:Patient has no known allergies.    Past Medical History:   Diagnosis Date    Goiter 2010    Hypertension 2022       Past Surgical History:   Procedure Laterality Date     SECTION N/A 2017    Procedure: REPEAT  SECTION X 3;  Surgeon: Sukhi Hines MD;  Location: [facility];  Service:      SECTION  2009     SECTION  2012       CURRENT MEDS:    Current Outpatient Medications:     losartan (COZAAR) 50 MG tablet, Take 1 tablet (50 mg) by mouth daily, Disp: 90 tablet, Rfl: 1    Family History   Problem Relation Age of Onset    Diabetes Mother         age 59    Hypertension Mother     Cerebrovascular Disease Mother     Kidney Disease Mother     Diabetes Father     Coronary Artery Disease Father     Hypertension Father     Kidney Disease Father     Pulmonary Embolism Sister     Myocardial Infarction Sister     Diabetes Brother         age 30    Kidney failure Brother     Heart Disease Brother     Hypertension Brother     Hypertension Maternal Grandmother     Hypertension Paternal Grandfather         reports that she has quit smoking. Her smoking use included cigarettes and other. She has a 5 pack-year smoking history. She has been exposed to tobacco smoke. She uses smokeless tobacco. She reports current alcohol use. She reports that she does not use drugs.    Review of Systems:  The 12 system review is within normal limits except for as mentioned above.  General ROS: No  complaints or constitutional symptoms  Ophthalmic ROS: No complaints of visual changes  Skin: No complaints or symptoms   Endocrine: No complaints or symptoms  Hematologic/Lymphatic: No symptoms or complaints  Psychiatric: No symptoms or complaints  Respiratory ROS: no cough, shortness of breath, or wheezing  Cardiovascular ROS: no chest pain or dyspnea on exertion  Gastrointestinal ROS: As per HPI  Genito-Urinary ROS: no dysuria, trouble voiding, or hematuria  Musculoskeletal ROS: no joint or muscle pain  Neurological ROS: no TIA or stroke symptoms      EXAM:  There were no vitals taken for this visit.  GENERAL: Well developed female, No acute distress, pleasant and conversant   EYES: Pupils equal, round and reactive, no scleral icterus  ABDOMEN: Soft, nontender, nondistended  SKIN: Pink, warm and dry, no obvious rashes or lesions   NEURO:No focal deficits, ambulatory  MUSCULOSKELETAL:No obvious deformities, no swelling, normal appearing      LABS:  Lab Results   Component Value Date    WBC 10.0 08/02/2024    HGB 14.4 08/02/2024    HGB 13.4@ 02/13/2009    HCT 44.3 08/02/2024    MCV 99 08/02/2024     08/02/2024     INR/Prothrombin Time  @LABRCNTIP(NA,K,CL,co2,bun,creatinine,labglom,glucose,calcium)@  Lab Results   Component Value Date     (H) 08/02/2024    AST 66 (H) 08/02/2024    ALKPHOS 74 08/02/2024       IMAGES:   Relevant images were reviewed and discussed with the patient.  Notable findings were: Ultrasound images demonstrates a large gallstone    Assessment/Plan:   Samantha Pompa is a 33 year old female with signs and symptoms consistent with a large asymptomatic gallstone.  I had an extensive discussion with her regarding the increased risk of biliary cancer with large gallstones and the need to have this removed at some point in the near future.    The risks of surgery were discussed in detail which include, but are not limited to, bile leak, bleeding, infection, injury to surrounding  structures, the need to convert to an open procedure, blood clots, stroke, heart attack and death.  Additionally, the risks of non operative management were discussed which include, but are not limited to, worsening infection, increased pain, sepsis and death.     She understands everything which was discussed and would like to pursue the abdominoplasty in [place] next week since she has had the scheduled for a year and a half.  I think this is reasonable as there does not seem to be any urgency in terms of removing her gallstone in the next 1 to 2 weeks.  When she returns from [place] in the next couple weeks she will follow-up with me to discuss surgery timing to remove her gallbladder.    Boby Scruggs, DO FACS  [phone]  [facility] Department of Surgery

## 2024-08-12 NOTE — LETTER
2024      Samantha Pompa  [address]  [place] [address]      Dear Colleague,    Thank you for referring your patient, Samantha Pompa, to the [facility] [place]. Please see a copy of my visit note below.    HPI: Samantha Pompa is a 33 year old female referred to see me by Kay Arroyo for incidental cholelithiasis.  He was seen as an outpatient and noted to have elevated LFTs with no biliary colic signs.  She underwent subsequent ultrasound which demonstrated a large gallstone.  She denies any history of biliary colic or difficulty eating.  She is concerned about her gallstone and does not want surgery at this time because she has an abdominoplasty scheduled for next week in [place].    Allergies:Patient has no known allergies.    Past Medical History:   Diagnosis Date     Goiter 2010     Hypertension 2022       Past Surgical History:   Procedure Laterality Date      SECTION N/A 2017    Procedure: REPEAT  SECTION X 3;  Surgeon: Sukhi Hines MD;  Location: [facility];  Service:       SECTION  2009      SECTION  2012       CURRENT MEDS:    Current Outpatient Medications:      losartan (COZAAR) 50 MG tablet, Take 1 tablet (50 mg) by mouth daily, Disp: 90 tablet, Rfl: 1    Family History   Problem Relation Age of Onset     Diabetes Mother         age 59     Hypertension Mother      Cerebrovascular Disease Mother      Kidney Disease Mother      Diabetes Father      Coronary Artery Disease Father      Hypertension Father      Kidney Disease Father      Pulmonary Embolism Sister      Myocardial Infarction Sister      Diabetes Brother         age 30     Kidney failure Brother      Heart Disease Brother      Hypertension Brother      Hypertension Maternal Grandmother      Hypertension Paternal Grandfather         reports that she has quit smoking. Her smoking use included cigarettes and other. She has a 5  pack-year smoking history. She has been exposed to tobacco smoke. She uses smokeless tobacco. She reports current alcohol use. She reports that she does not use drugs.    Review of Systems:  The 12 system review is within normal limits except for as mentioned above.  General ROS: No complaints or constitutional symptoms  Ophthalmic ROS: No complaints of visual changes  Skin: No complaints or symptoms   Endocrine: No complaints or symptoms  Hematologic/Lymphatic: No symptoms or complaints  Psychiatric: No symptoms or complaints  Respiratory ROS: no cough, shortness of breath, or wheezing  Cardiovascular ROS: no chest pain or dyspnea on exertion  Gastrointestinal ROS: As per HPI  Genito-Urinary ROS: no dysuria, trouble voiding, or hematuria  Musculoskeletal ROS: no joint or muscle pain  Neurological ROS: no TIA or stroke symptoms      EXAM:  There were no vitals taken for this visit.  GENERAL: Well developed female, No acute distress, pleasant and conversant   EYES: Pupils equal, round and reactive, no scleral icterus  ABDOMEN: Soft, nontender, nondistended  SKIN: Pink, warm and dry, no obvious rashes or lesions   NEURO:No focal deficits, ambulatory  MUSCULOSKELETAL:No obvious deformities, no swelling, normal appearing      LABS:  Lab Results   Component Value Date    WBC 10.0 08/02/2024    HGB 14.4 08/02/2024    HGB 13.4@ 02/13/2009    HCT 44.3 08/02/2024    MCV 99 08/02/2024     08/02/2024     INR/Prothrombin Time  @LABRCNTIP(NA,K,CL,co2,bun,creatinine,labglom,glucose,calcium)@  Lab Results   Component Value Date     (H) 08/02/2024    AST 66 (H) 08/02/2024    ALKPHOS 74 08/02/2024       IMAGES:   Relevant images were reviewed and discussed with the patient.  Notable findings were: Ultrasound images demonstrates a large gallstone    Assessment/Plan:   Samantha Pompa is a 33 year old female with signs and symptoms consistent with a large asymptomatic gallstone.  I had an extensive discussion with her  regarding the increased risk of biliary cancer with large gallstones and the need to have this removed at some point in the near future.    The risks of surgery were discussed in detail which include, but are not limited to, bile leak, bleeding, infection, injury to surrounding structures, the need to convert to an open procedure, blood clots, stroke, heart attack and death.  Additionally, the risks of non operative management were discussed which include, but are not limited to, worsening infection, increased pain, sepsis and death.     She understands everything which was discussed and would like to pursue the abdominoplasty in [place] next week since she has had the scheduled for a year and a half.  I think this is reasonable as there does not seem to be any urgency in terms of removing her gallstone in the next 1 to 2 weeks.  When she returns from [place] in the next couple weeks she will follow-up with me to discuss surgery timing to remove her gallbladder.    Boby Scruggs DO [facility]  [phone]  [facility] Department of Surgery      Again, thank you for allowing me to participate in the care of your patient.        Sincerely,        Boby Scruggs DO

## 2024-08-19 ENCOUNTER — MYC MEDICAL ADVICE (OUTPATIENT)
Dept: FAMILY MEDICINE | Facility: CLINIC | Age: 34
End: 2024-08-19
Payer: COMMERCIAL

## 2024-08-19 DIAGNOSIS — E66.01 MORBID OBESITY (H): Primary | ICD-10-CM

## 2024-08-19 DIAGNOSIS — K76.0 NAFLD (NONALCOHOLIC FATTY LIVER DISEASE): ICD-10-CM

## 2024-08-19 DIAGNOSIS — I10 BENIGN ESSENTIAL HYPERTENSION: ICD-10-CM

## 2024-08-20 ENCOUNTER — TELEPHONE (OUTPATIENT)
Dept: FAMILY MEDICINE | Facility: CLINIC | Age: 34
End: 2024-08-20

## 2024-08-20 NOTE — TELEPHONE ENCOUNTER
Retail Pharmacy Prior Authorization Team   Phone: [phone]    PRIOR AUTHORIZATION DENIED    Medication: WEGOVY 0.25 MG/0.5ML SC SOAJ  Insurance Company: [employer] - Phone [phone] Fax [phone]  Denial Date: 8/19/2024  Denial Reason(s): WEIGHT LOSS DRUGS ARE EXCLUDED FROM COVERAGE      Appeal Information: IF THE PROVIDER WOULD LIKE TO APPEAL THIS DECISION PLEASE PROVIDE THE PA TEAM WITH A LETTER OF MEDICAL NECESSITY      Patient Notified: NO

## 2024-08-27 ENCOUNTER — MYC MEDICAL ADVICE (OUTPATIENT)
Dept: FAMILY MEDICINE | Facility: CLINIC | Age: 34
End: 2024-08-27
Payer: COMMERCIAL

## 2024-09-06 ENCOUNTER — VIRTUAL VISIT (OUTPATIENT)
Dept: PHARMACY | Facility: CLINIC | Age: 34
End: 2024-09-06
Attending: NURSE PRACTITIONER
Payer: COMMERCIAL

## 2024-09-06 DIAGNOSIS — I10 HYPERTENSION, UNSPECIFIED TYPE: ICD-10-CM

## 2024-09-06 DIAGNOSIS — Z78.9 TAKES DIETARY SUPPLEMENTS: ICD-10-CM

## 2024-09-06 DIAGNOSIS — Z76.89 ENCOUNTER FOR WEIGHT MANAGEMENT: Primary | ICD-10-CM

## 2024-09-06 PROCEDURE — 99605 MTMS BY PHARM NP 15 MIN: CPT | Mod: 95

## 2024-09-06 PROCEDURE — 99607 MTMS BY PHARM ADDL 15 MIN: CPT | Mod: 95

## 2024-09-06 RX ORDER — GLUCOSAMINE/D3/BOSWELLIA SERRA 1500MG-400
1 TABLET ORAL DAILY
COMMUNITY

## 2024-09-06 RX ORDER — ONDANSETRON 4 MG/1
4 TABLET, ORALLY DISINTEGRATING ORAL EVERY 6 HOURS PRN
COMMUNITY
Start: 2024-08-22

## 2024-09-06 NOTE — PATIENT INSTRUCTIONS
"Recommendations from today's MTM visit:                                                    MTM (medication therapy management) is a service provided by a clinical pharmacist designed to help you get the most of out of your medicines.   Today we reviewed what your medicines are for, how to know if they are working, that your medicines are safe and how to make your medicine regimen as easy as possible.      I will fax an appeal letter to [employer] and keep you updated on this.    Follow-up: as needed     It was great speaking with you today.  I value your experience and would be very thankful for your time in providing feedback in our clinic survey. In the next few days, you may receive an email or text message from [employer] with a link to a survey related to your  clinical pharmacist.\"     To schedule another MTM appointment, please call the clinic directly or you may call the MTM scheduling line at [phone].    My Clinical Pharmacist's contact information:                                                      Please feel free to contact me with any questions or concerns you have.      Kathy Hernandez, PharmD  Medication Therapy Management (MTM) Pharmacist   "

## 2024-09-06 NOTE — LETTER
"    To Whom It May Concern,    I am writing on behalf of my patient, Samantha Pompa  to document the medical necessity of Wegovy for the treatment of obesity (BMI at least 30 kg/m2). This letter provides information about the patient's medical history and diagnosis and a statement summarizing my treatment rationale.     Summary of Patient History and Diagnosis  Samantha Pompa is a 34 year old female with a diagnosis of obesity (BMI at least 30 kg/m2) and prediabetes (A1c of 6.0%), hypertension, and nonalcoholic fatty liver disease .     Estimated body mass index is 39.8 kg/m  as calculated from the following:    Height as of 8/2/24: 5' 2.5\" (1.588 m).    Weight as of 8/2/24: 221 lb 1.6 oz (100.3 kg).    Wt Readings from Last 4 Encounters:   08/02/24 221 lb 1.6 oz (100.3 kg)   05/10/24 226 lb 14.4 oz (102.9 kg)   09/27/22 223 lb (101.2 kg)   05/01/17 212 lb (96.2 kg)       Treatment Rationale  Despite lifestyle/health improvements with nutrition, exercise, and behavioral changes for at least 7 months, the patient has been unable to achieve significant and sustainable weight loss.     Previous attempts with the below medications were unsuccessful due to intolerable side effects and/or are contraindicated:   Phentermine & Qsymia not recommended due to hypertension     Wegovy (semaglutide) is an FDA-approved medication for chronic weight management in adults with a BMI of 30 or higher or a BMI of 27 or higher with weight-related comorbidity. The patient's BMI qualifies them for Wegovy, which has shown remarkable efficacy and a favorable safety profile. Patient has no history of pancreatitis. The patient has no personal or family history of medullary thyroid carcinoma or MEN2.     The patient's unsuccessful weight management attempts and previous medication failures highlight the need for Wegovy as a medically necessary treatment option. Denying coverage would hinder the patient's progress and increase the risk of " obesity-related health conditions.    I kindly request that you review Samantha's case and reconsider coverage for Wegovy as an integral part of their obesity treatment plan.     Duration  12 months     Summary  In summary, Wegovy is medically necessary for this patient s medical condition. Please call my office at [phone] if I can provide you with any additional information to approve my request. I look forward to receiving your timely response and approval of this request.       Kathy Hernandez, PharmD  Medication Therapy Management (MTM) Pharmacist

## 2024-09-06 NOTE — PROGRESS NOTES
"Medication Therapy Management (MTM) Encounter    ASSESSMENT:                            Medication Adherence/Access: No issues identified    Weight Management    Indicated for weight loss me with BMI >30. She also has NAFLD, prediabetes, and hypertension and would benefit from a GLP-1.     Hypertension   Improved. Continue to monitor.      Supplements   Stable. Patient will discontinue vitamin C, iron, and folic acid after she finishes current supply post-op.     PLAN:                            I will fax an appeal letter to [facility] and keep you updated on the status of this.    Follow-up: as needed     SUBJECTIVE/OBJECTIVE:                          Samantha Pompa is a 34 year old female seen for an initial visit. She was referred to me from Kay Arroyo CNP.      Reason for visit: comprehensive medication review     Allergies/ADRs: Reviewed in chart  Past Medical History: Reviewed in chart  Tobacco: She reports that she has quit smoking. Her smoking use included cigarettes and other. She has a 5 pack-year smoking history. She has been exposed to tobacco smoke. She uses smokeless tobacco.  Alcohol: none  Caffeine: 1 cup coffee/day    Medication Adherence/Access: no issues reported. Medications filled at [facility].     Weight Management   Wegovy was denied by insurance     Patient is willing to try Wegovy if covered by insurance. Declines compounded medication and oral weight loss medications at this time.   Lowest weight in adulthood was 190 lbs.     Nutrition/Eating Habits: low carb, no sweets or alcohol, low sodium    Exercise/Activity: cardio twice weekly.   Medications Tried/Failed:  none     Wt Readings from Last 4 Encounters:   08/02/24 221 lb 1.6 oz (100.3 kg)   05/10/24 226 lb 14.4 oz (102.9 kg)   09/27/22 223 lb (101.2 kg)   05/01/17 212 lb (96.2 kg)     Estimated body mass index is 39.8 kg/m  as calculated from the following:    Height as of 8/2/24: 5' 2.5\" (1.588 m).    Weight as of 8/2/24: 221 lb 1.6 " oz (100.3 kg).    Hypertension   Losartan 50mg once daily   Patient reports no current medication side effects  Patient self monitors blood pressure.  Home BP monitoring systolic 125-130s, diastolic 80s or lower .       Supplements   Taking vitamin C, folic acid, and Hema-Plex iron perioperatively for recent abdominoplasty.   Biotin [id number] once daily for hair/skin/nails - Reports this is effective  No reported issues at this time.          Today's Vitals: There were no vitals taken for this visit.  ----------------    I spent 22 minutes with this patient today. All changes were made via collaborative practice agreement with Kay Arroyo CNP. A copy of the visit note was provided to the patient's provider(s).    A summary of these recommendations was sent via [employer].    Kody LinaresD  Medication Therapy Management (MTM) Pharmacist    Telemedicine Visit Details  Type of service:  Video Conference via [employer]  Start Time: 10:22 AM  End Time: 10:44 AM     Medication Therapy Recommendations  No medication therapy recommendations to display

## 2024-09-24 NOTE — TELEPHONE ENCOUNTER
Rep from [facility] contacted the PA Team regarding Dorothy's voicemail checking on the Wegovy Appeal status.  Appeal has been denied because it's a plan exclusion under the pt's plan. She will fax the appeal denial letter to the PA Team.

## 2024-09-25 NOTE — TELEPHONE ENCOUNTER
Retail Pharmacy Prior Authorization Team   Phone: [phone]    RECEIVED DENIAL LETTER FOR APPEAL -

## 2025-03-03 ENCOUNTER — MYC REFILL (OUTPATIENT)
Dept: FAMILY MEDICINE | Facility: CLINIC | Age: 35
End: 2025-03-03
Payer: COMMERCIAL

## 2025-03-03 DIAGNOSIS — I10 PRIMARY HYPERTENSION: ICD-10-CM

## 2025-03-03 RX ORDER — LOSARTAN POTASSIUM 50 MG/1
50 TABLET ORAL DAILY
Qty: 90 TABLET | Refills: 1 | Status: SHIPPED | OUTPATIENT
Start: 2025-03-03

## 2025-04-21 ENCOUNTER — VIRTUAL VISIT (OUTPATIENT)
Dept: FAMILY MEDICINE | Facility: CLINIC | Age: 35
End: 2025-04-21
Payer: COMMERCIAL

## 2025-04-21 DIAGNOSIS — I10 PRIMARY HYPERTENSION: Primary | ICD-10-CM

## 2025-04-21 DIAGNOSIS — E66.01 MORBID OBESITY (H): ICD-10-CM

## 2025-04-21 DIAGNOSIS — K76.0 NAFLD (NONALCOHOLIC FATTY LIVER DISEASE): ICD-10-CM

## 2025-04-21 PROCEDURE — 1125F AMNT PAIN NOTED PAIN PRSNT: CPT | Mod: 95 | Performed by: NURSE PRACTITIONER

## 2025-04-21 PROCEDURE — 98006 SYNCH AUDIO-VIDEO EST MOD 30: CPT | Performed by: NURSE PRACTITIONER

## 2025-04-21 RX ORDER — LOSARTAN POTASSIUM 50 MG/1
50 TABLET ORAL DAILY
Qty: 90 TABLET | Refills: 0 | Status: SHIPPED | OUTPATIENT
Start: 2025-04-21

## 2025-04-21 NOTE — PROGRESS NOTES
Samantha is a 34 year old who is being evaluated via a billable video visit.    How would you like to obtain your AVS? MyChart  If the video visit is dropped, the invitation should be resent by: Text to cell phone: [phone]  Will anyone else be joining your video visit? No        Subjective   Samantha is a 34 year old, presenting for the following health issues:  Recheck Medication        4/21/2025    11:53 AM   Additional Questions   Roomed by  lpn     History of Present Illness       Hypertension: She presents for follow up of hypertension.  She does not check blood pressure  regularly outside of the clinic. Outside blood pressures have been over 140/90. She does not follow a low salt diet.     She eats 2-3 servings of fruits and vegetables daily.She consumes 1 sweetened beverage(s) daily.She exercises with enough effort to increase her heart rate 30 to 60 minutes per day.  She exercises with enough effort to increase her heart rate 3 or less days per week. She is missing 1 dose(s) of medications per week.  She is not taking prescribed medications regularly due to other.        Hypertension Follow-up    Do you check your blood pressure regularly outside of the clinic? No   Are you following a low salt diet? Yes  Are your blood pressures ever more than 140 on the top number (systolic) OR more   than 90 on the bottom number (diastolic), for example 140/90? No    BP Readings from Last 2 Encounters:   08/02/24 138/78   05/18/24 (!) 149/101         Review of Systems  Constitutional, neuro, ENT, endocrine, pulmonary, cardiac, gastrointestinal, genitourinary, musculoskeletal, integument and psychiatric systems are negative, except as otherwise noted.  Needs BP medicaiton refilled.  No side effects noted.       Objective    Vitals - Patient Reported  Weight (Patient Reported): 101.6 kg (224 lb)  Pain Score: Mild Pain (3)  Pain Loc:  (Upper right Quadrant)        Physical Exam   GENERAL: alert and no distress  EYES: Eyes  grossly normal to inspection.  No discharge or erythema, or obvious scleral/conjunctival abnormalities.  RESP: No audible wheeze, cough, or visible cyanosis.    SKIN: Visible skin clear. No significant rash, abnormal pigmentation or lesions.  NEURO: Cranial nerves grossly intact.  Mentation and speech appropriate for age.  PSYCH: Appropriate affect, tone, and pace of words    Lab results reviewed in Epic    A/P:  1. Primary hypertension (Primary)  Due for follow-up in clinic  - losartan (COZAAR) 50 MG tablet; Take 1 tablet (50 mg) by mouth daily.  Dispense: 90 tablet; Refill: 0  - CBC with platelets; Future  - Comprehensive metabolic panel (BMP + Alb, Alk Phos, ALT, AST, Total. Bili, TP); Future    2. NAFLD (nonalcoholic fatty liver disease)  Stable  - Comprehensive metabolic panel (BMP + Alb, Alk Phos, ALT, AST, Total. Bili, TP); Future    3. Morbid obesity (H)  Working on weight loss  - Lipid panel reflex to direct LDL Fasting; Future  - TSH with free T4 reflex; Future  - Comprehensive metabolic panel (BMP + Alb, Alk Phos, ALT, AST, Total. Bili, TP); Future        Video-Visit Details    Type of service:  Video Visit   Originating Location (pt. Location): Other work    Distant Location (provider location):  On-site  Platform used for Video Visit: Shamir  Signed Electronically by: Kay Salinas CNP

## 2025-05-07 ENCOUNTER — MYC MEDICAL ADVICE (OUTPATIENT)
Dept: FAMILY MEDICINE | Facility: CLINIC | Age: 35
End: 2025-05-07
Payer: COMMERCIAL

## 2025-05-20 SDOH — HEALTH STABILITY: PHYSICAL HEALTH: ON AVERAGE, HOW MANY DAYS PER WEEK DO YOU ENGAGE IN MODERATE TO STRENUOUS EXERCISE (LIKE A BRISK WALK)?: 4 DAYS

## 2025-05-20 SDOH — HEALTH STABILITY: PHYSICAL HEALTH: ON AVERAGE, HOW MANY MINUTES DO YOU ENGAGE IN EXERCISE AT THIS LEVEL?: 60 MIN

## 2025-05-20 ASSESSMENT — SOCIAL DETERMINANTS OF HEALTH (SDOH): HOW OFTEN DO YOU GET TOGETHER WITH FRIENDS OR RELATIVES?: ONCE A WEEK

## 2025-06-04 SDOH — HEALTH STABILITY: PHYSICAL HEALTH: ON AVERAGE, HOW MANY MINUTES DO YOU ENGAGE IN EXERCISE AT THIS LEVEL?: 60 MIN

## 2025-06-04 SDOH — HEALTH STABILITY: PHYSICAL HEALTH: ON AVERAGE, HOW MANY DAYS PER WEEK DO YOU ENGAGE IN MODERATE TO STRENUOUS EXERCISE (LIKE A BRISK WALK)?: 4 DAYS

## 2025-06-04 ASSESSMENT — SOCIAL DETERMINANTS OF HEALTH (SDOH): HOW OFTEN DO YOU GET TOGETHER WITH FRIENDS OR RELATIVES?: ONCE A WEEK

## 2025-06-05 ENCOUNTER — OFFICE VISIT (OUTPATIENT)
Dept: FAMILY MEDICINE | Facility: CLINIC | Age: 35
End: 2025-06-05
Payer: COMMERCIAL

## 2025-06-05 VITALS
RESPIRATION RATE: 16 BRPM | DIASTOLIC BLOOD PRESSURE: 92 MMHG | SYSTOLIC BLOOD PRESSURE: 130 MMHG | OXYGEN SATURATION: 98 % | BODY MASS INDEX: 38.45 KG/M2 | HEART RATE: 85 BPM | WEIGHT: 217 LBS | HEIGHT: 63 IN | TEMPERATURE: 98.7 F

## 2025-06-05 DIAGNOSIS — Z00.00 ROUTINE GENERAL MEDICAL EXAMINATION AT A HEALTH CARE FACILITY: Primary | ICD-10-CM

## 2025-06-05 DIAGNOSIS — K76.0 NAFLD (NONALCOHOLIC FATTY LIVER DISEASE): ICD-10-CM

## 2025-06-05 DIAGNOSIS — Z11.3 SCREEN FOR STD (SEXUALLY TRANSMITTED DISEASE): ICD-10-CM

## 2025-06-05 DIAGNOSIS — E66.01 MORBID OBESITY (H): ICD-10-CM

## 2025-06-05 DIAGNOSIS — I10 PRIMARY HYPERTENSION: ICD-10-CM

## 2025-06-05 LAB
ERYTHROCYTE [DISTWIDTH] IN BLOOD BY AUTOMATED COUNT: 11.8 % (ref 10–15)
HCT VFR BLD AUTO: 45.5 % (ref 35–47)
HGB BLD-MCNC: 14.5 G/DL (ref 11.7–15.7)
MCH RBC QN AUTO: 30.7 PG (ref 26.5–33)
MCHC RBC AUTO-ENTMCNC: 31.9 G/DL (ref 31.5–36.5)
MCV RBC AUTO: 96 FL (ref 78–100)
PLATELET # BLD AUTO: 319 10E3/UL (ref 150–450)
RBC # BLD AUTO: 4.72 10E6/UL (ref 3.8–5.2)
WBC # BLD AUTO: 8.8 10E3/UL (ref 4–11)

## 2025-06-05 RX ORDER — LOSARTAN POTASSIUM 50 MG/1
50 TABLET ORAL DAILY
Qty: 90 TABLET | Refills: 3 | Status: SHIPPED | OUTPATIENT
Start: 2025-06-05

## 2025-06-05 NOTE — PATIENT INSTRUCTIONS
Patient Education   Preventive Care Advice   This is general advice given by our system to help you stay healthy. However, your care team may have specific advice just for you. Please talk to your care team about your preventive care needs.  Nutrition  Eat 5 or more servings of fruits and vegetables each day.  Try wheat bread, brown rice and whole grain pasta (instead of white bread, rice, and pasta).  Get enough calcium and vitamin D. Check the label on foods and aim for 100% of the RDA (recommended daily allowance).  Lifestyle  Exercise at least 150 minutes each week  (30 minutes a day, 5 days a week).  Do muscle strengthening activities 2 days a week. These help control your weight and prevent disease.  No smoking.  Wear sunscreen to prevent skin cancer.  Have a dental exam and cleaning every 6 months.  Yearly exams  See your health care team every year to talk about:  Any changes in your health.  Any medicines your care team has prescribed.  Preventive care, family planning, and ways to prevent chronic diseases.  Shots (vaccines)   HPV shots (up to age 26), if you've never had them before.  Hepatitis B shots (up to age 59), if you've never had them before.  COVID-19 shot: Get this shot when it's due.  Flu shot: Get a flu shot every year.  Tetanus shot: Get a tetanus shot every 10 years.  Pneumococcal, hepatitis A, and RSV shots: Ask your care team if you need these based on your risk.  Shingles shot (for age 50 and up)  General health tests  Diabetes screening:  Starting at age 35, Get screened for diabetes at least every 3 years.  If you are younger than age 35, ask your care team if you should be screened for diabetes.  Cholesterol test: At age 39, start having a cholesterol test every 5 years, or more often if advised.  Bone density scan (DEXA): At age 50, ask your care team if you should have this scan for osteoporosis (brittle bones).  Hepatitis C: Get tested at least once in your life.  STIs (sexually  transmitted infections)  Before age 24: Ask your care team if you should be screened for STIs.  After age 24: Get screened for STIs if you're at risk. You are at risk for STIs (including HIV) if:  You are sexually active with more than one person.  You don't use condoms every time.  You or a partner was diagnosed with a sexually transmitted infection.  If you are at risk for HIV, ask about PrEP medicine to prevent HIV.  Get tested for HIV at least once in your life, whether you are at risk for HIV or not.  Cancer screening tests  Cervical cancer screening: If you have a cervix, begin getting regular cervical cancer screening tests starting at age 21.  Breast cancer scan (mammogram): If you've ever had breasts, begin having regular mammograms starting at age 40. This is a scan to check for breast cancer.  Colon cancer screening: It is important to start screening for colon cancer at age 45.  Have a colonoscopy test every 10 years (or more often if you're at risk) Or, ask your provider about stool tests like a FIT test every year or Cologuard test every 3 years.  To learn more about your testing options, visit:   .  For help making a decision, visit:   https://bit.ly/bq51305.  Prostate cancer screening test: If you have a prostate, ask your care team if a prostate cancer screening test (PSA) at age 55 is right for you.  Lung cancer screening: If you are a current or former smoker ages 50 to 80, ask your care team if ongoing lung cancer screenings are right for you.  For informational purposes only. Not to replace the advice of your health care provider. Copyright   2023 [facility]. All rights reserved. Clinically reviewed by the [facility]. [employer] 437207 - REV 01/24.  Learning About Stress  What is stress?     Stress is your body's response to a hard situation. Your body can have a physical, emotional, or mental response. Stress is a fact of life for most people, and it  affects everyone differently. What causes stress for you may not be stressful for someone else.  A lot of things can cause stress. You may feel stress when you go on a job interview, take a test, or run a race. This kind of short-term stress is normal and even useful. It can help you if you need to work hard or react quickly. For example, stress can help you finish an important job on time.  Long-term stress is caused by ongoing stressful situations or events. Examples of long-term stress include long-term health problems, ongoing problems at work, or conflicts in your family. Long-term stress can harm your health.  How does stress affect your health?  When you are stressed, your body responds as though you are in danger. It makes hormones that speed up your heart, make you breathe faster, and give you a burst of energy. This is called the fight-or-flight stress response. If the stress is over quickly, your body goes back to normal and no harm is done.  But if stress happens too often or lasts too long, it can have bad effects. Long-term stress can make you more likely to get sick, and it can make symptoms of some diseases worse. If you tense up when you are stressed, you may develop neck, shoulder, or low back pain. Stress is linked to high blood pressure and heart disease.  Stress also harms your emotional health. It can make you brody, tense, or depressed. Your relationships may suffer, and you may not do well at work or school.  What can you do to manage stress?  You can try these things to help manage stress:   Do something active. Exercise or activity can help reduce stress. Walking is a great way to get started. Even everyday activities such as housecleaning or yard work can help.  Try yoga or noel chi. These techniques combine exercise and meditation. You may need some training at first to learn them.  Do something you enjoy. For example, listen to music or go to a movie. Practice your hobby or do volunteer  "work.  Meditate. This can help you relax, because you are not worrying about what happened before or what may happen in the future.  Do guided imagery. Imagine yourself in any setting that helps you feel calm. You can use online videos, books, or a teacher to guide you.  Do breathing exercises. For example:  From a standing position, bend forward from the waist with your knees slightly bent. Let your arms dangle close to the floor.  Breathe in slowly and deeply as you return to a standing position. Roll up slowly and lift your head last.  Hold your breath for just a few seconds in the standing position.  Breathe out slowly and bend forward from the waist.  Let your feelings out. Talk, laugh, cry, and express anger when you need to. Talking with supportive friends or family, a counselor, or a jose leader about your feelings is a healthy way to relieve stress. Avoid discussing your feelings with people who make you feel worse.  Write. It may help to write about things that are bothering you. This helps you find out how much stress you feel and what is causing it. When you know this, you can find better ways to cope.  What can you do to prevent stress?  You might try some of these things to help prevent stress:  Manage your time. This helps you find time to do the things you want and need to do.  Get enough sleep. Your body recovers from the stresses of the day while you are sleeping.  Get support. Your family, friends, and community can make a difference in how you experience stress.  Limit your news feed. Avoid or limit time on social media or news that may make you feel stressed.  Do something active. Exercise or activity can help reduce stress. Walking is a great way to get started.  Where can you learn more?  Go to https://www.[employer].net/patiented  Enter N032 in the search box to learn more about \"Learning About Stress.\"  Current as of: October 24, 2024  Content Version: 14.4 2024-2025 [employer] " "[employer].   Care instructions adapted under license by your healthcare professional. If you have questions about a medical condition or this instruction, always ask your healthcare professional. [employer] disclaims any warranty or liability for your use of this information.    9 Ways to Cut Back on Drinking  Maybe you've found yourself drinking more alcohol than you'd prefer. If you want to cut back, here are some ideas to try.    Think before you drink.  Do you really want a drink, or is it just a habit? If you're used to having a drink at a certain time, try doing something else then.     Look for substitutes.  Find some no-alcohol drinks that you enjoy, like flavored seltzer water, tea with honey, or tonic with a slice of lime. Or try alcohol-free beer or \"virgin\" cocktails (without the alcohol).     Drink more water.  Use water to quench your thirst. Drink a glass of water before you have any alcohol. Have another glass along with every drink or between drinks.     Shrink your drink.  For example, have a bottle of beer instead of a pint. Use a smaller glass for wine. Choose drinks with lower alcohol content (ABV%). Or use less liquor and more mixer in cocktails.     Slow down.  It's easy to drink quickly and without thinking about it. Pay attention, and make each drink last longer.     Do the math.  Total up how much you spend on alcohol each month. How much is that a year? If you cut back, what could you do with the money you save?     Take a break.  Choose a day or two each week when you won't drink at all. Notice how you feel on those days, physically and emotionally. How did you sleep? Do you feel better? Over time, add more break days.     Count calories.  Would you like to lose some weight? For some people that's a good motivator for cutting back. Figure out how many calories are in each drink. How many does that add up to in a day? In a week? In a month?     Practice saying no.  Be ready when " "someone offers you a drink. Try: \"Thanks, I've had enough.\" Or \"Thanks, but I'm cutting back.\" Or \"No, thanks. I feel better when I drink less.\"   Current as of: August 20, 2024  Content Version: 14.4    2378-2009 [employer].   Care instructions adapted under license by your healthcare professional. If you have questions about a medical condition or this instruction, always ask your healthcare professional. [employer] disclaims any warranty or liability for your use of this information.  Safer Sex: Care Instructions  Overview  Safer sex is a way to reduce your risk of getting a sexually transmitted infection (STI). It can also help prevent pregnancy.  Several products can help you practice safer sex and reduce your chance of STIs. One of the best is a condom. There are internal and external condoms. You can use a special rubber sheet (dental dam) for protection during oral sex. Disposable gloves can keep your hands from touching blood, semen, or other body fluids that can carry infections.  Remember that birth control methods such as diaphragms, IUDs, foams, and birth control pills do not stop you from getting STIs.  Follow-up care is a key part of your treatment and safety. Be sure to make and go to all appointments, and call your doctor if you are having problems. It's also a good idea to know your test results and keep a list of the medicines you take.  How can you care for yourself at home?  Think about getting vaccinated to help prevent hepatitis A, hepatitis B, and human papillomavirus (HPV). They can be spread through sex.  Use a condom every time you have sex. Use an external condom, which goes on the penis. Or use an internal condom, which goes into the vagina or anus.  Make sure you use the right size external condom. A condom that's too small can break easily. A condom that's too big can slip off during sex.  Use a new condom each time you have sex. Be careful not to poke a hole in " "the condom when you open the wrapper.  Don't use an internal condom and an external condom at the same time.  Never use petroleum jelly (such as Vaseline), grease, hand lotion, baby oil, or anything with oil in it. These products can make holes in the condom.  After intercourse, hold the edge of the condom as you remove it. This will help keep semen from spilling out of the condom.  Do not have sex with anyone who has symptoms of an STI, such as sores on the genitals or mouth.  Do not drink a lot of alcohol or use drugs before sex.  Limit your sex partners. Sex with one partner who has sex only with you can reduce your risk of getting an STI.  Don't share sex toys. But if you do share them, use a condom and clean the sex toys between each use.  Talk to any partners before you have sex. Talk about what you feel comfortable with and whether you have any boundaries with sex. And find out if your partner or partners may be at risk for any STI. Keep in mind that a person may be able to spread an STI even if they do not have symptoms. You and any partners may want to get tested for STIs.  Where can you learn more?  Go to https://www.Hiddenbed.net/patiented  Enter B608 in the search box to learn more about \"Safer Sex: Care Instructions.\"  Current as of: April 30, 2024  Content Version: 14.4 2024-2025 [employer].   Care instructions adapted under license by your healthcare professional. If you have questions about a medical condition or this instruction, always ask your healthcare professional. [employer] disclaims any warranty or liability for your use of this information.       "

## 2025-06-05 NOTE — PROGRESS NOTES
Preventive Care Visit  [facility]  Ml Steele MD, Family Medicine  Jun 5, 2025      Assessment & Plan     (Z00.00) Routine general medical examination at a health care facility  (primary encounter diagnosis)  Comment: encourage annual pe and vaccine up date. Pt wants to remove the iud that will be due soon and insert another.   Plan: Lipid panel reflex to direct LDL Fasting,         Comprehensive metabolic panel (BMP + Alb, Alk         Phos, ALT, AST, Total. Bili, TP), TSH with free        T4 reflex, CBC with platelets        Advise pt to see ob/gyn for iud removal and insert at the same time     (I10) Primary hypertension  Comment: bp not good controlled. She takes medication as instructed and no side effect. She dose not check bp at home. Denies cp, sob, palpitation, headache and blurry vision  Plan: losartan (COZAAR) 50 MG tablet        We discussed to increase the dose of medication for better bp control or continue current dose and she start monitor bp at home, in the mean time she try to loss weight, hopefully bp will be better. She wants to try loss weight first.     (E66.01) Morbid obesity (H)  Comment: bmi 39.06 with htn. Failed to loss weight.   Plan: semaglutide-weight management (WEGOVY) 0.25         MG/0.5ML pen        Will try glp-1 and side effect addressed. Follow-up in one month.     (K76.0) NAFLD (nonalcoholic fatty liver disease)  Comment: reviewed the chart pt has fatty liver.   Plan: strongly advise low fat diet and avoid drinking alcohol.     (Z11.3) Screen for STD (sexually transmitted disease)  Comment:   Plan: NEISSERIA GONORRHOEA PCR, CHLAMYDIA TRACHOMATIS        PCR       The longitudinal plan of care for the diagnosis(es)/condition(s) as documented were addressed during this visit. Due to the added complexity in care, I will continue to support Samantha in the subsequent management and with ongoing continuity of care.         Patient has been advised of split  "billing requirements and indicates understanding: Yes        Nicotine/Tobacco Cessation  She reports that she has been smoking other and vaping device. She has been exposed to tobacco smoke. She uses smokeless tobacco.  Nicotine/Tobacco Cessation Plan  Information offered: Patient not interested at this time      BMI  Estimated body mass index is 39.06 kg/m  as calculated from the following:    Height as of this encounter: 1.588 m (5' 2.5\").    Weight as of this encounter: 98.4 kg (217 lb).   Weight management plan: Discussed healthy diet and exercise guidelines    Counseling  Appropriate preventive services were addressed with this patient via screening, questionnaire, or discussion as appropriate for fall prevention, nutrition, physical activity, Tobacco-use cessation, social engagement, weight loss and cognition.  Checklist reviewing preventive services available has been given to the patient.  Reviewed patient's diet, addressing concerns and/or questions.   She is at risk for psychosocial distress and has been provided with information to reduce risk.   The patient reports drinking more than 3 alcoholic drinks per day and/or more than 7 drhnks per week. The patient was counseled and given information about possible harmful effects of excessive alcohol intake.    Follow-up   No follow-ups on file.     Follow-up Visit   Expected date:  Jun 05, 2026 (Approximate)      Follow Up Appointment Details:     Follow-up with whom?: PCP    Follow-Up for what?: Adult Preventive    How?: In Person                 Rafael Singh is a 34 year old, presenting for the following:  Physical (Fasting & STD check no concerns but recently had a new partner) and Weight Loss (Discuss medications)        6/5/2025    11:46 AM   Additional Questions   Roomed by Gen DIMITRIS CMA          HPI       Hypertension Follow-up    Do you check your blood pressure regularly outside of the clinic? No   Are you following a low salt diet? No  Are your " blood pressures ever more than 140 on the top number (systolic) OR more   than 90 on the bottom number (diastolic), for example 140/90? N/A    BP Readings from Last 2 Encounters:   06/05/25 (!) 130/92   08/02/24 138/78       Advance Care Planning    Discussed advance care planning with patient; however, patient declined at this time.        6/4/2025   General Health   How would you rate your overall physical health? Good   Feel stress (tense, anxious, or unable to sleep) To some extent   (!) STRESS CONCERN      6/4/2025   Nutrition   Three or more servings of calcium each day? Yes   Diet: Regular (no restrictions)   How many servings of fruit and vegetables per day? (!) 2-3   How many sweetened beverages each day? 0-1         6/4/2025   Exercise   Days per week of moderate/strenous exercise 4 days   Average minutes spent exercising at this level 60 min         6/4/2025   Social Factors   Frequency of gathering with friends or relatives Once a week   Worry food won't last until get money to buy more No   Food not last or not have enough money for food? No   Do you have housing? (Housing is defined as stable permanent housing and does not include staying outside in a car, in a tent, in an abandoned building, in an overnight shelter, or couch-surfing.) Yes   Are you worried about losing your housing? No   Lack of transportation? No   Unable to get utilities (heat,electricity)? No         6/4/2025   Dental   Dentist two times every year? Yes           Today's PHQ-2 Score:       4/21/2025     9:51 AM   PHQ-2 ( 1999 [employer])   Q1: Little interest or pleasure in doing things 0   Q2: Feeling down, depressed or hopeless 0   PHQ-2 Score 0    Q1: Little interest or pleasure in doing things Not at all   Q2: Feeling down, depressed or hopeless Not at all   PHQ-2 Score 0       Patient-reported         6/4/2025   Substance Use   Alcohol more than 3/day or more than 7/wk Yes   How often do you have a drink containing alcohol 2 to 3  times a week   How many alcohol drinks on typical day 5 or 6   How often do you have 5+ drinks at one occasion Monthly   Audit 2/3 Score 4   How often not able to stop drinking once started Never   How often failed to do what normally expected Never   How often needed first drink in am after a heavy drinking session Never   How often feeling of guilt or remorse after drinking Never   How often unable to remember what happened the night before Never   Have you or someone else been injured because of your drinking No   Has anyone been concerned or suggested you cut down on drinking No   TOTAL SCORE - AUDIT 7   Do you use any other substances recreationally? No     Social History     Tobacco Use    Smoking status: Some Days     Current packs/day: 0.50     Average packs/day: 0.5 packs/day for 10.0 years (5.0 ttl pk-yrs)     Types: Cigarettes, Other     Passive exposure: Past    Smokeless tobacco: Current   Vaping Use    Vaping status: Some Days    Substances: Nicotine    Devices: Pre-filled pod   Substance Use Topics    Alcohol use: Yes     Comment: 1-2 times per week    Drug use: No          Mammogram Screening - Patient under 40 years of age: Routine Mammogram Screening not recommended.         6/4/2025   STI Screening   New sexual partner(s) since last STI/HIV test? (!) YES      History of abnormal Pap smear: No - age 30- 64 PAP with HPV every 5 years recommended        Latest Ref Rng & Units 10/24/2022    12:35 PM   PAP / HPV   PAP  Atypical squamous cells of undetermined significance (ASC-US)    HPV 16 DNA Negative Negative    HPV 18 DNA Negative Negative    Other HR HPV Negative Negative            6/4/2025   Contraception/Family Planning   Questions about contraception or family planning (!) YES         Reviewed and updated as needed this visit by Provider   Tobacco  Allergies  Meds  Problems  Med Hx  Surg Hx  Fam Hx            Lab work is in process  Labs reviewed in EPIC      Review of  "Systems  Constitutional, HEENT, cardiovascular, pulmonary, GI, , musculoskeletal, neuro, skin, endocrine and psych systems are negative, except as otherwise noted.     Objective    Exam  BP (!) 130/92 (BP Location: Right arm, Patient Position: Sitting, Cuff Size: Adult Regular)   Pulse 85   Temp 98.7  F (37.1  C) (Oral)   Resp 16   Ht 1.588 m (5' 2.5\")   Wt 98.4 kg (217 lb)   SpO2 98%   Breastfeeding No   BMI 39.06 kg/m     Estimated body mass index is 39.06 kg/m  as calculated from the following:    Height as of this encounter: 1.588 m (5' 2.5\").    Weight as of this encounter: 98.4 kg (217 lb).    Physical Exam  GENERAL: alert and no distress  EYES: Eyes grossly normal to inspection, PERRL and conjunctivae and sclerae normal  HENT: ear canals and TM's normal, nose and mouth without ulcers or lesions  NECK: no adenopathy, no asymmetry, masses, or scars  RESP: lungs clear to auscultation - no rales, rhonchi or wheezes  CV: regular rate and rhythm, normal S1 S2, no S3 or S4, no murmur, click or rub, no peripheral edema  ABDOMEN: soft, nontender, no hepatosplenomegaly, no masses and bowel sounds normal  MS: no gross musculoskeletal defects noted, no edema  SKIN: no suspicious lesions or rashes  NEURO: Normal strength and tone, mentation intact and speech normal  PSYCH: mentation appears normal, affect normal/bright        Signed Electronically by: Ml Steele MD    "

## 2025-06-06 ENCOUNTER — RESULTS FOLLOW-UP (OUTPATIENT)
Dept: FAMILY MEDICINE | Facility: CLINIC | Age: 35
End: 2025-06-06

## 2025-06-06 ENCOUNTER — TELEPHONE (OUTPATIENT)
Dept: FAMILY MEDICINE | Facility: CLINIC | Age: 35
End: 2025-06-06
Payer: COMMERCIAL

## 2025-06-06 NOTE — TELEPHONE ENCOUNTER
Prior Authorization Retail Medication Request    Medication/Dose: semaglutide-weight management (WEGOVY) 0.25 MG/0.5ML pen   Diagnosis and ICD code (if different than what is on RX):  E66.01  New/renewal/insurance change PA/secondary ins. PA:   Previously Tried and Failed:    Rationale:      Insurance   Primary: [facility]  Insurance ID:  [id number]    Secondary (if applicable):  Insurance ID:      Pharmacy Information (if different than what is on RX)  Name:  [facility]  Phone:  [phone]  Fax:[phone]    Clinic Information  Preferred routing pool for dept communication: [facility] Pool

## 2025-06-09 ENCOUNTER — RESULTS FOLLOW-UP (OUTPATIENT)
Dept: FAMILY MEDICINE | Facility: CLINIC | Age: 35
End: 2025-06-09

## 2025-06-10 ENCOUNTER — MYC MEDICAL ADVICE (OUTPATIENT)
Dept: FAMILY MEDICINE | Facility: CLINIC | Age: 35
End: 2025-06-10
Payer: COMMERCIAL

## 2025-06-10 ENCOUNTER — TELEPHONE (OUTPATIENT)
Dept: SURGERY | Facility: CLINIC | Age: 35
End: 2025-06-10
Payer: COMMERCIAL

## 2025-06-10 DIAGNOSIS — E66.01 MORBID OBESITY (H): Primary | ICD-10-CM

## 2025-06-10 NOTE — TELEPHONE ENCOUNTER
PRIOR AUTHORIZATION DENIED    Medication: WEGOVY 0.25 MG/0.5ML SC SOAJ  Insurance Company: [employer] Non-Specialty PA's - Phone [phone] Fax [phone]  Denial Date: 6/10/2025  Denial Reason(s): PLAN EXCLUSION  Appeal Information: N/A  Patient Notified: NO  Unfortunately, we cannot call the patient with denials because we do not know what next steps the MD will take nor can we give medical advice, please notify the patient of what they are to expect for the continuation of their therapy from the provider.

## 2025-06-10 NOTE — TELEPHONE ENCOUNTER
Returning patients call about scheduling her surgery with Dr. Scruggs. She was last seen August 2024, offered to schedule her a new consult with Dr. Scruggs. Patient is starting a new job and getting new insurance. She has decided she will wait until her new job starts and she has everything lined up with her new insurance before proceeding.

## 2025-06-10 NOTE — TELEPHONE ENCOUNTER
Retail Pharmacy Prior Authorization Team   Phone: [phone]    PA Initiation    Medication: WEGOVY 0.25 MG/0.5ML SC SOAJ  Insurance Company: [employer] Non-Specialty PA's - Phone [phone] Fax [phone]  Pharmacy Filling the Rx: [id number] - [place], [place] - [address] AT [facility] [address] & [address] [place]  Filling Pharmacy Phone: [phone]  Filling Pharmacy Fax: [phone]  Start Date: 6/10/2025

## 2025-06-12 ENCOUNTER — PATIENT OUTREACH (OUTPATIENT)
Dept: CARE COORDINATION | Facility: CLINIC | Age: 35
End: 2025-06-12
Payer: COMMERCIAL

## 2025-06-16 ENCOUNTER — PATIENT OUTREACH (OUTPATIENT)
Dept: CARE COORDINATION | Facility: CLINIC | Age: 35
End: 2025-06-16
Payer: COMMERCIAL

## 2025-08-25 ENCOUNTER — PATIENT OUTREACH (OUTPATIENT)
Dept: FAMILY MEDICINE | Facility: CLINIC | Age: 35
End: 2025-08-25
Payer: COMMERCIAL